# Patient Record
Sex: FEMALE | Race: WHITE | Employment: FULL TIME | URBAN - METROPOLITAN AREA
[De-identification: names, ages, dates, MRNs, and addresses within clinical notes are randomized per-mention and may not be internally consistent; named-entity substitution may affect disease eponyms.]

---

## 2017-06-22 PROBLEM — Z00.00 MEDICARE ANNUAL WELLNESS VISIT, SUBSEQUENT: Status: ACTIVE | Noted: 2017-06-22

## 2017-07-20 PROBLEM — Z71.89 ADVANCED CARE PLANNING/COUNSELING DISCUSSION: Chronic | Status: ACTIVE | Noted: 2017-07-20

## 2017-07-20 PROBLEM — Z00.00 WELCOME TO MEDICARE PREVENTIVE VISIT: Status: ACTIVE | Noted: 2017-07-20

## 2017-07-20 PROBLEM — Z71.89 ADVANCED CARE PLANNING/COUNSELING DISCUSSION: Status: ACTIVE | Noted: 2017-07-20

## 2017-07-20 PROBLEM — Z00.00 WELCOME TO MEDICARE PREVENTIVE VISIT: Chronic | Status: ACTIVE | Noted: 2017-07-20

## 2017-08-22 PROBLEM — Z12.11 SPECIAL SCREENING FOR MALIGNANT NEOPLASMS, COLON: Status: ACTIVE | Noted: 2017-08-22

## 2018-10-29 ENCOUNTER — HOSPITAL ENCOUNTER (OUTPATIENT)
Dept: MAMMOGRAPHY | Age: 68
Discharge: HOME | End: 2018-10-29
Attending: FAMILY MEDICINE
Payer: MEDICARE

## 2018-10-29 DIAGNOSIS — M94.9 DISORDER OF BONE AND CARTILAGE: ICD-10-CM

## 2018-10-29 DIAGNOSIS — Z13.820 SCREENING FOR OSTEOPOROSIS: ICD-10-CM

## 2018-10-29 DIAGNOSIS — M89.9 DISORDER OF BONE AND CARTILAGE: ICD-10-CM

## 2018-10-29 PROBLEM — M85.852 OSTEOPENIA OF LEFT HIP: Status: ACTIVE | Noted: 2018-10-29

## 2018-10-29 PROCEDURE — 77080 DXA BONE DENSITY AXIAL: CPT

## 2019-09-25 PROBLEM — Z12.11 SPECIAL SCREENING FOR MALIGNANT NEOPLASMS, COLON: Status: RESOLVED | Noted: 2017-08-22 | Resolved: 2019-09-25

## 2020-09-19 PROBLEM — Z12.11 SPECIAL SCREENING FOR MALIGNANT NEOPLASMS, COLON: Status: RESOLVED | Noted: 2017-08-22 | Resolved: 2020-09-19

## 2022-03-19 PROBLEM — Z00.00 MEDICARE ANNUAL WELLNESS VISIT, SUBSEQUENT: Status: ACTIVE | Noted: 2017-07-20

## 2022-03-19 PROBLEM — Z71.89 ADVANCED CARE PLANNING/COUNSELING DISCUSSION: Status: ACTIVE | Noted: 2017-07-20

## 2022-03-19 PROBLEM — M85.852 OSTEOPENIA OF LEFT HIP: Status: ACTIVE | Noted: 2018-10-29

## 2022-08-12 ENCOUNTER — OFFICE VISIT (OUTPATIENT)
Dept: ORTHOPEDIC SURGERY | Age: 72
End: 2022-08-12
Payer: COMMERCIAL

## 2022-08-12 ENCOUNTER — INITIAL CONSULT (OUTPATIENT)
Dept: CARDIOLOGY CLINIC | Age: 72
End: 2022-08-12
Payer: COMMERCIAL

## 2022-08-12 VITALS
SYSTOLIC BLOOD PRESSURE: 110 MMHG | DIASTOLIC BLOOD PRESSURE: 60 MMHG | WEIGHT: 165 LBS | HEART RATE: 71 BPM | BODY MASS INDEX: 28.17 KG/M2 | HEIGHT: 64 IN

## 2022-08-12 DIAGNOSIS — R53.82 CHRONIC FATIGUE: ICD-10-CM

## 2022-08-12 DIAGNOSIS — I10 PRIMARY HYPERTENSION: Primary | ICD-10-CM

## 2022-08-12 DIAGNOSIS — M70.61 TROCHANTERIC BURSITIS OF BOTH HIPS: Primary | ICD-10-CM

## 2022-08-12 DIAGNOSIS — E78.2 MIXED HYPERLIPIDEMIA: ICD-10-CM

## 2022-08-12 DIAGNOSIS — M70.62 TROCHANTERIC BURSITIS OF BOTH HIPS: Primary | ICD-10-CM

## 2022-08-12 DIAGNOSIS — R94.31 EKG, ABNORMAL: ICD-10-CM

## 2022-08-12 DIAGNOSIS — R06.02 SHORTNESS OF BREATH: ICD-10-CM

## 2022-08-12 PROCEDURE — 99204 OFFICE O/P NEW MOD 45 MIN: CPT | Performed by: INTERNAL MEDICINE

## 2022-08-12 PROCEDURE — 1123F ACP DISCUSS/DSCN MKR DOCD: CPT | Performed by: INTERNAL MEDICINE

## 2022-08-12 PROCEDURE — 93000 ELECTROCARDIOGRAM COMPLETE: CPT | Performed by: INTERNAL MEDICINE

## 2022-08-12 PROCEDURE — 20610 DRAIN/INJ JOINT/BURSA W/O US: CPT | Performed by: NURSE PRACTITIONER

## 2022-08-12 PROCEDURE — 1123F ACP DISCUSS/DSCN MKR DOCD: CPT | Performed by: NURSE PRACTITIONER

## 2022-08-12 RX ORDER — METHYLPREDNISOLONE ACETATE 40 MG/ML
40 INJECTION, SUSPENSION INTRA-ARTICULAR; INTRALESIONAL; INTRAMUSCULAR; SOFT TISSUE ONCE
Status: COMPLETED | OUTPATIENT
Start: 2022-08-12 | End: 2022-08-12

## 2022-08-12 RX ADMIN — METHYLPREDNISOLONE ACETATE 40 MG: 40 INJECTION, SUSPENSION INTRA-ARTICULAR; INTRALESIONAL; INTRAMUSCULAR; SOFT TISSUE at 09:01

## 2022-08-12 NOTE — PROGRESS NOTES
Patient ID:    Jeremy Hyatt  [id number]  16 y.o.  1950    Today: August 12, 2022      Chief Complaint:  Bilateral hip pain        Patient reports longstanding history of bilateral hip pain. The pain is predominately localized to the lateral hip and is characterized as a general ache with occasional sharp pain. They rate the pain ranging from 3-8 on the pain scale occurring in a cyclical fashion with periods of acute exacerbation. Symptoms are exacerbated with attempting to sleep on the hip, attempting to ascend stairs, and sitting for long periods of time which results in lateral hip pain. Patient denies significant anterior groin pain and denies significant issues with attempting to put on socks and shoes or when attempting to end into or out or a vehicle. No numbness of tingling going down the extremity. Patient has attempted prior conservative treatment including ice, heat, Tylenol, and NSAID's. Past Medical History:  Past Medical History:   Diagnosis Date    Hypertension     Map-dot-fingerprint corneal dystrophy     Dr. Carlos Manuel Villar    Mixed hyperlipidemia 5/8/2013       Past Surgical History:  Past Surgical History:   Procedure Laterality Date    BREAST SURGERY  1/20/03    CYST REMOVAL  7/23/93    RLQ removed at the time of the hernia repair    HERNIA REPAIR  7/23/93    rt side    HYSTEROSCOPY, STERILIZE W IMPLANTS  5/12/99    KNEE ARTHROSCOPY  2/1986    rt knee    OTHER SURGICAL HISTORY  11/16/98,1/28/99    stromal pucture rt eye for Map Dot Finger print dystrophy        Medications:     Prior to Admission medications    Medication Sig Start Date End Date Taking?  Authorizing Provider   calcium citrate-vitamin D (CITRACAL+D) 315-200 MG-UNIT per tablet Take 1 tablet by mouth daily (with breakfast)    Ar Automatic Reconciliation   carboxymethylcellulose (THERATEARS) 1 % ophthalmic gel Apply to eye    Ar Automatic Reconciliation   Cholecalciferol 50 MCG (2000 UT) TABS Take by mouth    Ar Automatic Reconciliation   Cyanocobalamin 1000 MCG SUBL Take 1,000 mcg by mouth daily    Ar Automatic Reconciliation   estradiol (ESTRACE) 0.1 MG/GM vaginal cream Place 2 g vaginally Twice a Week 22   Ar Automatic Reconciliation   hydroCHLOROthiazide (HYDRODIURIL) 12.5 MG tablet Take 12.5 mg by mouth 2 times daily 3/29/22   Ar Automatic Reconciliation   lisinopril (PRINIVIL;ZESTRIL) 20 MG tablet Take 20 mg by mouth daily 3/29/22   Ar Automatic Reconciliation   olopatadine (PATADAY) 0.2 % SOLN ophthalmic solution Apply 1 drop to eye daily    Ar Automatic Reconciliation   rosuvastatin (CRESTOR) 5 MG tablet Take 5 mg by mouth 3/29/22   Ar Automatic Reconciliation       Family History:     Family History   Problem Relation Age of Onset    Heart Attack Father         age 28     Cancer Mother         breast  later in life    High Cholesterol Sister     High Cholesterol Brother        Social History:      Social History     Tobacco Use    Smoking status: Never    Smokeless tobacco: Never   Substance Use Topics    Alcohol use: Yes     Alcohol/week: 1.7 standard drinks         Allergies: Allergies   Allergen Reactions    Atorvastatin Other (See Comments)    Erythromycin Nausea And Vomiting          ROS:  Patient Health Form has been filled out, reviewed, signed and included in the chart. ROS findings related to musculoskeletal problems were discussed with the patient. Patient advised to discuss non-orthopedic complaints with primary care physician. Objective:     Vitals: There were no vitals taken for this visit. General: Awake and alert. AAOx3. The patient ambulates with an antalgic gait. Psych: Mood appropriate  HEENT: Normocephalic, atraumatic  Neck: Supple  CV/Pulm: Breathing even and unlabored  Abdomen: Nondistended  Circulation: Normal without obvious arterial or venous deficiency. Pulses palpable bilateral lower extremities.   Lymphatic: No obvious lymphedema or lymphadenopathy noted.  Skin: No obvious lacerations or rashes noted. Musculoskeletal: No obvious deformity or pain with active movement of the upper extremities. Neuro: No obvious deficiency or weakness noted of the upper or lower extremities    Hip Exam:   Exam of the hip reveals tenderness to palpation over the trochanter. Minimal groin pain with resisted leg raise and fadir testing. Motion in preserved in the involved hip. No evidence of arterial of venous insufficiency. DP/PT pulses appreciable. Able to plantar- and dorsi-flex the foot/ankle. Imaging:    Images obtained today or prior    XR Pelvis 2/3 Views  Views Obtained: AP pelvis, lateral bilateral hip  Indication: bilateral Hip Pain  Findings: A/P Pelvis and lateral of the hip(s) were reviewed which demonstrate no obvious arthritic changes of the bilateral hip. There is no osteophyte formation around the acetabulum and femoral head. No obvious subchondral sclerosis noted. No obvious fracture appreciated. There is no acute bony abnormality such as malignancy appreciated. Impression: Normal appearing hip without evidence of advanced OA    TONE CHACON - CNP      Assessment:   Trochanteric Bursitis of the Hip    Plan:  Differential diagnosis and treatment options have been discussed with the patient. Risks, benefits and alternatives of each were discussed and patient questions answered. We discussed oral anti-inflammatory medications, IT band stretching, physical therapy and corticosteroid injections. We discussed that this generally is not a surgically managed condition. At this point the patient would like to proceed with Trochanteric bursal injection.  We discussed potential risks of steroid injection including but not limited to steroid flare with an associated increase in pain, fat necrosis, skin discoloration around the injection site, temporary increase in blood sugars in diabetic patients and possible facial flushing after injection. Treatment Rendered/ Procedure Note:    After discussion of risks, benefits and alternatives the patient wished to proceed with trochanteric injection. After prepping the area overlying the trochanteric bursal region of each hip the area was injected with 1cc of 40mg DepoMedrol along with 2-3cc of 0.25% marcaine. The patient tolerated the procedure without significant problem. RECOMMENDATIONS:    We discussed the importance of IT band stretching. Stretching needs to be done 2-3 times daily for a period of 6 weeks followed by maintenance stretching to be done 1-2 times daily for the foreseeable future. A handout discussing trochanteric bursitis along with stretching maneuvers is provided to the patient today. If the patient is still having significant issues/pain after 4-6 weeks can call for followup. If the patient fails to see any relief from treatment we may opt to evaluate the patient's hip and/or lumbar spine more closely. Otherwise the patient call followup as needed.      Glenn Palacios, TONE - CNP

## 2022-08-12 NOTE — PATIENT INSTRUCTIONS
Bursitis: Care Instructions  Your Care Instructions     A bursa is a small sac of fluid that helps the tissues around a joint slide over one another easily. Injury or overuse of a joint can cause pain, redness, and inflammation in the bursa (bursitis). Bursitis usually gets better if you avoid the activity that caused it. You can help prevent bursitis from coming back by doing stretching and strengthening exercises. You may also need tochange the way you do some activities. Follow-up care is a key part of your treatment and safety. Be sure to make and go to all appointments, and call your doctor if you are having problems. It's also a good idea to know your test results and keep alist of the medicines you take. How can you care for yourself at home? Put ice or a cold pack on the area for 10 to 20 minutes at a time. Try to do this every 1 to 2 hours for the next 3 days (when you are awake) or until the swelling goes down. Put a thin cloth between the ice and your skin. After the 3 days of using ice, you may use heat on the area. You can use a hot water bottle; a warm, moist towel; or a heating pad set on low. You can also try alternating heat and ice. Rest the area where you have pain. Stop any activities that cause pain. Switch to activities that do not stress the area. Take pain medicines exactly as directed. If the doctor gave you a prescription medicine for pain, take it as prescribed. If you are not taking a prescription pain medicine, ask your doctor if you can take an over-the-counter medicine. Do not take two or more pain medicines at the same time unless the doctor told you to. Many pain medicines have acetaminophen, which is Tylenol. Too much acetaminophen (Tylenol) can be harmful. To prevent stiffness, gently move the joint as much as you can without pain every day. As the pain gets better, keep doing range-of-motion exercises.  Ask your doctor for exercises that will make the muscles around the joint stronger. Do these as directed. You can slowly return to the activity that caused the pain, but do it with less effort until you can do it without pain or swelling. Be sure to warm up before and stretch after you do the activity. When should you call for help? Call your doctor now or seek immediate medical care if:    You have new or worse symptoms of infection, such as: Increased pain, swelling, warmth, or redness. Red streaks leading from the area. Pus draining from the area. A fever. Watch closely for changes in your health, and be sure to contact your doctor if:    You do not get better as expected. Where can you learn more? Go to [email or web address]://[email or web address] timeads.Groupspeak. org and sign in to your [employer] account. Enter N131 in the [employer] box to learn more about \"Bursitis: Care Instructions. \"     If you do not have an account, please click on the \"Sign Up Now\" link. Current as of: March 9, 2022               Content Version: 13.3  © 2006-2022 [employer]wise[employer] Incorporated. Care instructions adapted under license by [facility] ([facility]). If you have questions about a medical condition or this instruction, always ask your healthcare professional. [place] [address] any warranty or liability for your use of this information.

## 2022-08-12 NOTE — PROGRESS NOTES
[address], [address] [employer] [facility], [address] [place] [address]  PHONE: [phone]        22    NAME:  Trisha Issa  : 1950  MRN: [id number]       SUBJECTIVE:   Trisha Issa is a 71 y.o. female seen for a consultation visit regarding the following:     Chief Complaint   Patient presents with    Hypertension    Consultation          HPI:  Consultation is requested by Mikel Ramirez MD for evaluation of Hypertension and Consultation  Here for family hx of CAD. Some lack of energy. No real COVINGTON. NO CP, pressure. Cannot do as much now. Patient denies recent history of orthopnea, PND, excessive dizziness and/or syncope. Some hip pains, bursitis. Father  from MI, siblings with CAD. Past Medical History, Past Surgical History, Family history, Social History, and Medications were all reviewed with the patient today and updated as necessary. Current Outpatient Medications   Medication Sig Dispense Refill    DHA-EPA-Flaxseed Oil-Vitamin E (THERA TEARS NUTRITION PO) Take by mouth      calcium citrate-vitamin D (CITRACAL+D) 315-200 MG-UNIT per tablet Take 1 tablet by mouth daily (with breakfast)      carboxymethylcellulose (THERATEARS) 1 % ophthalmic gel Apply to eye      Cholecalciferol 50 MCG ( UT) TABS Take by mouth      Cyanocobalamin 1000 MCG SUBL Take 1,000 mcg by mouth daily      estradiol (ESTRACE) 0.1 MG/GM vaginal cream Place 2 g vaginally Twice a Week      hydroCHLOROthiazide (HYDRODIURIL) 12.5 MG tablet Take 12.5 mg by mouth 2 times daily      olopatadine (PATADAY) 0.2 % SOLN ophthalmic solution Apply 1 drop to eye daily      rosuvastatin (CRESTOR) 5 MG tablet Take 5 mg by mouth       No current facility-administered medications for this visit.         Allergies   Allergen Reactions    Atorvastatin Other (See Comments)    Erythromycin Nausea And Vomiting     Patient Active Problem List    Diagnosis Date Noted    Chronic fatigue 2022     Priority: Medium    Shortness of breath 2022     Priority: Medium    EKG, abnormal 2022     Priority: Medium    Osteopenia of left hip 10/29/2018     DEXA Oct 18        Advanced care planning/counseling discussion 2017     Updated 20        Medicare annual wellness visit, subsequent 2017        Atrophic vaginitis 2016    Glaucoma suspect 2016    Mixed hyperlipidemia 2013    Hypertension       Past Surgical History:   Procedure Laterality Date    BREAST SURGERY  03    CYST REMOVAL  93    RLQ removed at the time of the hernia repair    HERNIA REPAIR  93    rt side    HYSTEROSCOPY, STERILIZE W IMPLANTS  99    KNEE ARTHROSCOPY  1986    rt knee    OTHER SURGICAL HISTORY  98,99    stromal pucture rt eye for Map Dot Finger print dystrophy     Family History   Problem Relation Age of Onset    Heart Attack Father         age 28     Cancer Mother         breast  later in life    High Cholesterol Sister     High Cholesterol Brother      Social History     Tobacco Use    Smoking status: Never    Smokeless tobacco: Never   Substance Use Topics    Alcohol use: Yes     Alcohol/week: 1.7 standard drinks       ROS:    Constitutional:   Negative for fevers and unexplained weight loss. Eyes:   Negative for visual disturbance. ENT:   Negative for significant hearing loss and tinnitus. Respiratory:   Negative for hemoptysis. Cardiovascular:   Negative except as noted in HPI. Gastrointestinal:   Negative for melena and abdominal pain. Genitourinary:   Negative for hematuria, renal stones. Integumentary:   Negative for rash or non-healing wounds  Hematologic/Lymphatic:   Negative for excessive bleeding hx or clotting disorder. Musculoskeletal:  Negative for active, unexplained/severe joint pain. Neurological:   Negative for stroke. Behavioral/Psych:   Negative for suicidal ideations.    Endocrine:   Negative for uncontrolled diabetic symptoms including polyuria, polydipsia and poor wound healing. PHYSICAL EXAM:     /60   Pulse 71   Ht 5' 4\" (1.626 m)   Wt 165 lb (74.8 kg)   BMI 28.32 kg/m²    General/Constitutional:   Alert and oriented x 3, no acute distress  HEENT:   normocephalic, atraumatic, moist mucous membranes  Neck:   No JVD or carotid bruits bilaterally  Cardiovascular:   regular rate and rhythm, no murmur/rub/gallop appreciated  Pulmonary:   clear to auscultation bilaterally, no respiratory distress  Abdomen:   soft, non-tender, non-distended  Ext:   No sig LE edema bilaterally  Skin:  warm and dry, no obvious rashes seen  Neuro:   no obvious sensory or motor deficits  Psychiatric:   normal mood and affect      EKG Today and independently reviewed by me: sinus rhythm, normal intervals and non-specific ST/T wave changes. Medical problems, medical history and test results were reviewed with the patient today.        Lab Results   Component Value Date/Time     03/29/2022 09:50 AM    K 4.1 03/29/2022 09:50 AM    CL 96 03/29/2022 09:50 AM    CO2 25 03/29/2022 09:50 AM    BUN 13 03/29/2022 09:50 AM    CREATININE 0.84 03/29/2022 09:50 AM    GLUCOSE 99 03/29/2022 09:50 AM    CALCIUM 10.0 03/29/2022 09:50 AM        No results found for: WBC, HGB, HCT, MCV, PLT    No results found for: TSHFT4, TSH    No results found for: LABA1C  No results found for: EAG      Lab Results   Component Value Date    CHOL 179 09/28/2021    CHOL 270 (H) 10/06/2020    CHOL 243 (H) 11/07/2019     Lab Results   Component Value Date    TRIG 82 09/28/2021    TRIG 95 10/06/2020    TRIG 89 11/07/2019     Lab Results   Component Value Date    HDL 74 09/28/2021    HDL 80 10/06/2020    HDL 77 11/07/2019     Lab Results   Component Value Date    LDLCALC 90 09/28/2021    LDLCALC 174 (H) 10/06/2020    LDLCALC 148 (H) 11/07/2019     Lab Results   Component Value Date    LABVLDL 18 11/07/2019    VLDL 15 09/28/2021    VLDL 16 10/06/2020     No results found for: [place] VEL[place] have Independently reviewed prior care notes, any ER records available, cardiac testing, labs and results with the patient and before seeing the patient today. Also independently reviewed outside records when available. ASSESSMENT:    Johnny Bland was seen today for hypertension and consultation. Diagnoses and all orders for this visit:    Hypertension  -     EKG 12 Lead    Mixed hyperlipidemia    Chronic fatigue  -     Transthoracic echocardiogram (TTE) complete with contrast, bubble, strain, and 3D PRN; Future  -     Nuclear stress test with myocardial perfusion; Future    Shortness of breath  -     Transthoracic echocardiogram (TTE) complete with contrast, bubble, strain, and 3D PRN; Future  -     Nuclear stress test with myocardial perfusion; Future    EKG, abnormal        PLAN:     HTN: BP low, would like off meds. Stop the Ace, remain on HCTZ for some ankle edema. Follow. Check echo. COVINGTON/Lack of energy: Given these risk factors and symptoms as outlined, plan for NST. We did review options of NST, LHC, other stress testing and agreed to proceed with NST in our office. To ER for worsening angina. Plan on definitive LHC for worsening angina. HPL: on crestor. The patient has been instructed to call with any angina or equivalent as reviewed today. All questions were answered with the patient voicing complete understanding. Patient has been instructed and agrees to call our office with any issues or other concerns related to their cardiac condition(s) and/or complaint(s).         Return for Return After Test.       VICKIE HENDERSON DO  8/12/2022

## 2022-08-15 ENCOUNTER — TELEPHONE (OUTPATIENT)
Dept: CARDIOLOGY CLINIC | Age: 72
End: 2022-08-15

## 2022-08-15 NOTE — TELEPHONE ENCOUNTER
Patient called and has questions on upcoming stress test. She wants someone to please give her a call and answer the questions she has.

## 2022-08-23 ENCOUNTER — TELEPHONE (OUTPATIENT)
Dept: CARDIOLOGY CLINIC | Age: 72
End: 2022-08-23

## 2022-08-23 NOTE — TELEPHONE ENCOUNTER
----- Message from Stew Kingston DO sent at 8/23/2022  1:37 PM EDT -----  Please call the patient. NST was ok, nothing major or concerning. If having more angina (worsening COVINGTON, CP, SOB), please let us know. Will review more at their follow up appointment and get plan for the future.     Thanks,   Chanda Julian

## 2022-09-19 PROBLEM — Z76.89 ENCOUNTER TO ESTABLISH CARE WITH NEW DOCTOR: Status: ACTIVE | Noted: 2017-07-20

## 2022-09-19 ASSESSMENT — ENCOUNTER SYMPTOMS
NAUSEA: 0
COUGH: 0
DIARRHEA: 0
ABDOMINAL PAIN: 0
SHORTNESS OF BREATH: 0
VOMITING: 0

## 2022-09-19 NOTE — PROGRESS NOTES
[address], DO  [address] [facility], [address], [address] [facility] [address]  [phone]        ASSESSMENT AND PLAN    Problem List Items Addressed This Visit          Circulatory    Hypertension     Controlled today. Patient doing well on her Hydrochlorothiazide, but takes it twice a day. States that she is up multiple times at night to use the bathroom. Discussed only taking the Hydrochlorothiazide once in the morning, refill sent, and advised to keep an eye on her blood pressure at home to make sure it does not go up. Will recheck in 3 months. Will have patient return next week for labs. Relevant Orders    Comprehensive Metabolic Panel    CBC with Auto Differential       Other    Mixed hyperlipidemia     Controlled one year ago when patient was put on Crestor 5 mg. Not fasting today. Will have her return next week for labs. Refill of Crestor sent to pharmacy. Relevant Medications    rosuvastatin (CRESTOR) 5 MG tablet    hydroCHLOROthiazide (HYDRODIURIL) 12.5 MG tablet    Other Relevant Orders    Lipid Panel    Encounter to establish care with new doctor - Primary        The diagnoses and plan were discussed with the patient, who verbalizes understanding and agrees with plan. All questions answered. Chief Complaint    Chief Complaint   Patient presents with    Establish Care    Medication Refill       HISTORY OF PRESENT ILLNESS    71 y.o. female presents today to establish care with a new primary care provider. Has hypertension, hyperlipidemia and family history of cardiac disease. Just saw [facility] five weeks ago per PCP request for mild dyspnea on exertion and lack of energy, was continued only on HCTZ for HTN and ankle swelling. Had an Echo and stress test, results below. States that since stopping the Lisinopril she has not had any more dyspnea on exertion.   States that she takes a low dose of Crestor for hyperlipidemia but states that she had to stop Atorvastatin due to muscle aches. Notes that she has a little discomfort on the Crestor but not much. Sees Dr. Karolyn Storm again later this week for follow up on her Echo and stress test.  Denies chest pain or SOB today. Has not had labs checked in the last year, states that she is not fasting today.     PAST MEDICAL HISTORY    Past Medical History:   Diagnosis Date    Hypertension     Map-dot-fingerprint corneal dystrophy     Dr. Daxa Blake    Mixed hyperlipidemia 2013       PAST SURGICAL HISTORY    Past Surgical History:   Procedure Laterality Date    BREAST SURGERY  03    CYST REMOVAL  93    RLQ removed at the time of the hernia repair    HERNIA REPAIR  93    rt side    HYSTEROSCOPY, STERILIZE W IMPLANTS  99    KNEE ARTHROSCOPY  1986    rt knee    OTHER SURGICAL HISTORY  98,99    stromal pucture rt eye for Map Dot Finger print dystrophy       FAMILY HISTORY    Family History   Problem Relation Age of Onset    Heart Attack Father         age 28     Cancer Mother         breast  later in life    High Cholesterol Sister     High Cholesterol Brother        SOCIAL HISTORY    Social History     Socioeconomic History    Marital status:      Spouse name: None    Number of children: None    Years of education: None    Highest education level: None   Tobacco Use    Smoking status: Never    Smokeless tobacco: Never   Substance and Sexual Activity    Alcohol use: Yes     Comment: occ    Drug use: No    Sexual activity: Yes     Comment: spouse       MEDICATIONS      Current Outpatient Medications:     rosuvastatin (CRESTOR) 5 MG tablet, Take 1 tablet by mouth daily, Disp: 90 tablet, Rfl: 5    hydroCHLOROthiazide (HYDRODIURIL) 12.5 MG tablet, Take 1 tablet by mouth daily, Disp: 90 tablet, Rfl: 5    DHA-EPA-Flaxseed Oil-Vitamin E (THERA TEARS NUTRITION PO), Take by mouth, Disp: , Rfl:     calcium citrate-vitamin D (CITRACAL+D) 315-200 MG-UNIT per tablet, Take 1 tablet by mouth daily (with breakfast), Disp: , Rfl:     carboxymethylcellulose (THERATEARS) 1 % ophthalmic gel, Apply to eye, Disp: , Rfl:     Cholecalciferol 50 MCG (2000 UT) TABS, Take by mouth, Disp: , Rfl:     Cyanocobalamin 1000 MCG SUBL, Take 1,000 mcg by mouth daily, Disp: , Rfl:     estradiol (ESTRACE) 0.1 MG/GM vaginal cream, Place 2 g vaginally Twice a Week, Disp: , Rfl:     olopatadine (PATADAY) 0.2 % SOLN ophthalmic solution, Apply 1 drop to eye daily, Disp: , Rfl:     ALLERGIES / INTOLERANCES    Allergies   Allergen Reactions    Atorvastatin Other (See Comments)    Erythromycin Nausea And Vomiting       REVIEW OF SYSTEMS    Review of Systems   Constitutional:  Negative for fever. HENT:  Negative for congestion. Respiratory:  Negative for cough and shortness of breath. Cardiovascular:  Negative for chest pain. Gastrointestinal:  Negative for abdominal pain, diarrhea, nausea and vomiting. Psychiatric/Behavioral:  Negative for dysphoric mood. PHYSICAL EXAMINATION    Vitals:    09/20/22 0915   BP: 134/70   Pulse: 68   Resp: 16   SpO2: 98%       Physical Exam  Vitals and nursing note reviewed. Constitutional:       General: She is not in acute distress. Appearance: Normal appearance. HENT:      Head: Normocephalic and atraumatic. Right Ear: External ear normal.      Left Ear: External ear normal.      Nose: Nose normal.   Eyes:      Extraocular Movements: Extraocular movements intact. Pupils: Pupils are equal, round, and reactive to light. Cardiovascular:      Rate and Rhythm: Normal rate and regular rhythm. Heart sounds: Normal heart sounds. No murmur heard. Pulmonary:      Effort: Pulmonary effort is normal. No respiratory distress. Breath sounds: Normal breath sounds. Abdominal:      General: Bowel sounds are normal.      Palpations: Abdomen is soft.       Tenderness: no abdominal tenderness There is no right CVA tenderness or left CVA 09/28/2021    TRIG 95 10/06/2020    TRIG 89 11/07/2019     Lab Results   Component Value Date    HDL 74 09/28/2021    HDL 80 10/06/2020    HDL 77 11/07/2019     Lab Results   Component Value Date    LDLCALC 90 09/28/2021    LDLCALC 174 (H) 10/06/2020    LDLCALC 148 (H) 11/07/2019     Lab Results   Component Value Date    LABVLDL 18 11/07/2019    VLDL 15 09/28/2021    VLDL 16 10/06/2020     No results found for: [facility]  Lab Results   Component Value Date/Time     03/29/2022 09:50 AM    K 4.1 03/29/2022 09:50 AM    CL 96 03/29/2022 09:50 AM    CO2 25 03/29/2022 09:50 AM    BUN 13 03/29/2022 09:50 AM    CREATININE 0.84 03/29/2022 09:50 AM    GLUCOSE 99 03/29/2022 09:50 AM    CALCIUM 10.0 03/29/2022 09:50 AM            Neno Davison DO  10:51 AM  09/20/22

## 2022-09-20 ENCOUNTER — OFFICE VISIT (OUTPATIENT)
Dept: PRIMARY CARE CLINIC | Facility: CLINIC | Age: 72
End: 2022-09-20
Payer: COMMERCIAL

## 2022-09-20 VITALS
OXYGEN SATURATION: 98 % | WEIGHT: 166.4 LBS | RESPIRATION RATE: 16 BRPM | HEIGHT: 64 IN | SYSTOLIC BLOOD PRESSURE: 134 MMHG | HEART RATE: 68 BPM | BODY MASS INDEX: 28.41 KG/M2 | DIASTOLIC BLOOD PRESSURE: 70 MMHG

## 2022-09-20 DIAGNOSIS — Z76.89 ENCOUNTER TO ESTABLISH CARE WITH NEW DOCTOR: Primary | ICD-10-CM

## 2022-09-20 DIAGNOSIS — E78.2 MIXED HYPERLIPIDEMIA: ICD-10-CM

## 2022-09-20 DIAGNOSIS — I10 PRIMARY HYPERTENSION: ICD-10-CM

## 2022-09-20 PROCEDURE — 1123F ACP DISCUSS/DSCN MKR DOCD: CPT | Performed by: FAMILY MEDICINE

## 2022-09-20 PROCEDURE — 99214 OFFICE O/P EST MOD 30 MIN: CPT | Performed by: FAMILY MEDICINE

## 2022-09-20 RX ORDER — ROSUVASTATIN CALCIUM 5 MG/1
5 TABLET, COATED ORAL DAILY
Qty: 90 TABLET | Refills: 5 | Status: SHIPPED | OUTPATIENT
Start: 2022-09-20

## 2022-09-20 RX ORDER — HYDROCHLOROTHIAZIDE 12.5 MG/1
12.5 TABLET ORAL DAILY
Qty: 90 TABLET | Refills: 5 | Status: SHIPPED | OUTPATIENT
Start: 2022-09-20

## 2022-09-20 ASSESSMENT — ANXIETY QUESTIONNAIRES
5. BEING SO RESTLESS THAT IT IS HARD TO SIT STILL: 0
IF YOU CHECKED OFF ANY PROBLEMS ON THIS QUESTIONNAIRE, HOW DIFFICULT HAVE THESE PROBLEMS MADE IT FOR YOU TO DO YOUR WORK, TAKE CARE OF THINGS AT HOME, OR GET ALONG WITH OTHER PEOPLE: NOT DIFFICULT AT ALL
GAD7 TOTAL SCORE: 0
6. BECOMING EASILY ANNOYED OR IRRITABLE: 0
1. FEELING NERVOUS, ANXIOUS, OR ON EDGE: 0
2. NOT BEING ABLE TO STOP OR CONTROL WORRYING: 0
7. FEELING AFRAID AS IF SOMETHING AWFUL MIGHT HAPPEN: 0
4. TROUBLE RELAXING: 0
3. WORRYING TOO MUCH ABOUT DIFFERENT THINGS: 0

## 2022-09-20 ASSESSMENT — PATIENT HEALTH QUESTIONNAIRE - PHQ9
SUM OF ALL RESPONSES TO PHQ QUESTIONS 1-9: 0
SUM OF ALL RESPONSES TO PHQ QUESTIONS 1-9: 0
SUM OF ALL RESPONSES TO PHQ9 QUESTIONS 1 & 2: 0
SUM OF ALL RESPONSES TO PHQ QUESTIONS 1-9: 0
2. FEELING DOWN, DEPRESSED OR HOPELESS: 0
1. LITTLE INTEREST OR PLEASURE IN DOING THINGS: 0
SUM OF ALL RESPONSES TO PHQ QUESTIONS 1-9: 0

## 2022-09-20 NOTE — PATIENT INSTRUCTIONS
IT WAS GREAT TO MEET YOU TODAY! I HAVE PLACED ORDERS FOR LABS FOR YOU TO COME NEXT WEEK. PLEASE COME FASTING (NOTHING TO EAT OR DRINK AFTER MIDNIGHT THE NIGHT BEFORE) SO THAT I CAN GET ACCURATE CHOLESTEROL TESTS. I HAVE SENT YOUR MEDICATION REFILLS TO THE PHARMACY. START TAKING THE HYDROCHLOROTHIAZIDE ONCE IN THE MORNING, NOT TWICE A DAY. CHECK YOUR BLOOD PRESSURE A COUPLE OF TIMES PER WEEK. IF IT STARTS TO GO UP (OVER 140/90 MORE THAN ONCE OR TWICE), WE WILL NEED TO INCREASE YOUR HYDROCHLOROTHIAZIDE. I WILL CONTACT YOU ABOUT YOUR LAB RESULTS ON [employer].     I WILL SEE YOU IN 3 MONTHS BUT PLEASE CALL WITH concerns - [phone]

## 2022-09-20 NOTE — ASSESSMENT & PLAN NOTE
Controlled one year ago when patient was put on Crestor 5 mg. Not fasting today. Will have her return next week for labs. Refill of Crestor sent to pharmacy.

## 2022-09-20 NOTE — ASSESSMENT & PLAN NOTE
Controlled today. Patient doing well on her Hydrochlorothiazide, but takes it twice a day. States that she is up multiple times at night to use the bathroom. Discussed only taking the Hydrochlorothiazide once in the morning, refill sent, and advised to keep an eye on her blood pressure at home to make sure it does not go up. Will recheck in 3 months. Will have patient return next week for labs.

## 2022-09-23 ENCOUNTER — OFFICE VISIT (OUTPATIENT)
Dept: CARDIOLOGY CLINIC | Age: 72
End: 2022-09-23
Payer: COMMERCIAL

## 2022-09-23 VITALS
HEART RATE: 87 BPM | HEIGHT: 64 IN | DIASTOLIC BLOOD PRESSURE: 76 MMHG | WEIGHT: 167.2 LBS | BODY MASS INDEX: 28.54 KG/M2 | SYSTOLIC BLOOD PRESSURE: 122 MMHG

## 2022-09-23 DIAGNOSIS — I34.0 NONRHEUMATIC MITRAL VALVE REGURGITATION: ICD-10-CM

## 2022-09-23 DIAGNOSIS — R94.31 EKG, ABNORMAL: ICD-10-CM

## 2022-09-23 DIAGNOSIS — E78.2 MIXED HYPERLIPIDEMIA: ICD-10-CM

## 2022-09-23 DIAGNOSIS — I10 PRIMARY HYPERTENSION: Primary | ICD-10-CM

## 2022-09-23 PROCEDURE — 1123F ACP DISCUSS/DSCN MKR DOCD: CPT | Performed by: INTERNAL MEDICINE

## 2022-09-23 PROCEDURE — 99214 OFFICE O/P EST MOD 30 MIN: CPT | Performed by: INTERNAL MEDICINE

## 2022-09-23 NOTE — PROGRESS NOTES
[address], [address] [facility], [address] [place] [address]  PHONE: [phone]     22    NAME:  Olive Ly  : 1950  MRN: [id number]       SUBJECTIVE:   Olive Ly is a 71 y.o. female seen for a follow up visit regarding the following:     Chief Complaint   Patient presents with    Shortness of Breath     Echo and NST        HPI:   Here for family hx of CAD. Nst : LVEF measures 79%. LV perfusion is normal. There is no evidence of inducible ischemia. Echo : normal EF, mod MR    Off the Ace now, feeling better now. Less [place], no CP. Feeling better. Home -140, better now. Patient denies recent history of orthopnea, PND, excessive dizziness and/or syncope. Some hip pains, bursitis. Father  from MI, siblings with CAD. Past Medical History, Past Surgical History, Family history, Social History, and Medications were all reviewed with the patient today and updated as necessary. Current Outpatient Medications   Medication Sig Dispense Refill    rosuvastatin (CRESTOR) 5 MG tablet Take 1 tablet by mouth daily 90 tablet 5    hydroCHLOROthiazide (HYDRODIURIL) 12.5 MG tablet Take 1 tablet by mouth daily 90 tablet 5    DHA-EPA-Flaxseed Oil-Vitamin E (THERA TEARS NUTRITION PO) Take by mouth      calcium citrate-vitamin D (CITRACAL+D) 315-200 MG-UNIT per tablet Take 1 tablet by mouth daily (with breakfast)      carboxymethylcellulose (THERATEARS) 1 % ophthalmic gel Apply to eye      Cholecalciferol 50 MCG (2000 UT) TABS Take by mouth      Cyanocobalamin 1000 MCG SUBL Take 1,000 mcg by mouth daily      estradiol (ESTRACE) 0.1 MG/GM vaginal cream Place 2 g vaginally as needed      olopatadine (PATADAY) 0.2 % SOLN ophthalmic solution Apply 1 drop to eye daily       No current facility-administered medications for this visit.         Allergies   Allergen Reactions    Atorvastatin Other (See Comments)    Erythromycin Nausea And Vomiting     Patient Active Problem List Diagnosis Date Noted    Nonrheumatic mitral valve regurgitation 2022     Priority: Medium    Chronic fatigue 2022     Priority: Medium    Shortness of breath 2022     Priority: Medium    EKG, abnormal 2022     Priority: Medium    Osteopenia of left hip 10/29/2018     DEXA Oct 18        Advanced care planning/counseling discussion 2017     Updated 20        Encounter to establish care with new doctor 2017        Atrophic vaginitis 2016    Glaucoma suspect 2016    Mixed hyperlipidemia 2013    Hypertension       Past Surgical History:   Procedure Laterality Date    BREAST SURGERY  03    CYST REMOVAL  93    RLQ removed at the time of the hernia repair    HERNIA REPAIR  93    rt side    HYSTEROSCOPY, STERILIZE W IMPLANTS  99    KNEE ARTHROSCOPY  1986    rt knee    OTHER SURGICAL HISTORY  98,99    stromal pucture rt eye for Map Dot Finger print dystrophy     Family History   Problem Relation Age of Onset    Heart Attack Father         age 28     Cancer Mother         breast  later in life    High Cholesterol Sister     High Cholesterol Brother      Social History     Tobacco Use    Smoking status: Never    Smokeless tobacco: Never   Substance Use Topics    Alcohol use: Yes     Comment: occ         ROS:    No obvious pertinent positives on review of systems except for what was outlined in the HPI today.       PHYSICAL EXAM:     /76   Pulse 87   Ht 5' 4\" (1.626 m)   Wt 167 lb 3.2 oz (75.8 kg)   BMI 28.70 kg/m²    General/Constitutional:   Alert and oriented x 3, no acute distress  HEENT:   normocephalic, atraumatic, moist mucous membranes  Neck:   No JVD or carotid bruits bilaterally  Cardiovascular:   regular rate and rhythm, no murmur/rub/gallop appreciated  Pulmonary:   clear to auscultation bilaterally, no respiratory distress  Abdomen:   soft, non-tender, non-distended  Ext:   No sig LE edema stress test.  We also discused the importance of ongoing risk factor modification for the prevention of future cardiovascular events. A healthy lifestyle was discussed. This would include heart-healthy diet, regular aerobic exercises, maintenance of desirable body weight and avoidance of tobacco products  Patient is encouraged to contact the office with any recurrent symptoms or concerns as reviewed today. All questions were answered with the patient voicing understanding. MR:  follow, echo in 2-3 yr. HPL: on crestor. Patient has been instructed and agrees to call our office with any issues or other concerns related to their cardiac condition(s) and/or complaint(s). Return in about 1 year (around 9/23/2023).        VICKIE HENDERSON, DO  9/23/2022

## 2022-10-27 ENCOUNTER — TELEPHONE (OUTPATIENT)
Dept: PRIMARY CARE CLINIC | Facility: CLINIC | Age: 72
End: 2022-10-27

## 2022-10-27 NOTE — TELEPHONE ENCOUNTER
Hi,   Patient stated that her blood pressure has gone up and wanted to discuss her medication and the dosage/frequency.    This morning it was 130/95      Please advise

## 2022-11-02 DIAGNOSIS — I10 PRIMARY HYPERTENSION: ICD-10-CM

## 2022-11-02 DIAGNOSIS — E78.2 MIXED HYPERLIPIDEMIA: ICD-10-CM

## 2022-11-03 LAB
ALBUMIN SERPL-MCNC: 4.1 G/DL (ref 3.2–4.6)
ALBUMIN/GLOB SERPL: 1.5 {RATIO} (ref 0.4–1.6)
ALP SERPL-CCNC: 65 U/L (ref 50–136)
ALT SERPL-CCNC: 25 U/L (ref 12–65)
ANION GAP SERPL CALC-SCNC: 2 MMOL/L (ref 2–11)
AST SERPL-CCNC: 15 U/L (ref 15–37)
BASOPHILS # BLD: 0 K/UL (ref 0–0.2)
BASOPHILS NFR BLD: 1 % (ref 0–2)
BILIRUB SERPL-MCNC: 0.7 MG/DL (ref 0.2–1.1)
BUN SERPL-MCNC: 12 MG/DL (ref 8–23)
CALCIUM SERPL-MCNC: 10 MG/DL (ref 8.3–10.4)
CHLORIDE SERPL-SCNC: 105 MMOL/L (ref 101–110)
CHOLEST SERPL-MCNC: 193 MG/DL
CO2 SERPL-SCNC: 31 MMOL/L (ref 21–32)
CREAT SERPL-MCNC: 0.9 MG/DL (ref 0.6–1)
DIFFERENTIAL METHOD BLD: ABNORMAL
EOSINOPHIL # BLD: 0.1 K/UL (ref 0–0.8)
EOSINOPHIL NFR BLD: 1 % (ref 0.5–7.8)
ERYTHROCYTE [DISTWIDTH] IN BLOOD BY AUTOMATED COUNT: 13.7 % (ref 11.9–14.6)
GLOBULIN SER CALC-MCNC: 2.7 G/DL (ref 2.8–4.5)
GLUCOSE SERPL-MCNC: 101 MG/DL (ref 65–100)
HCT VFR BLD AUTO: 43.9 % (ref 35.8–46.3)
HDLC SERPL-MCNC: 92 MG/DL (ref 40–60)
HDLC SERPL: 2.1 {RATIO}
HGB BLD-MCNC: 14 G/DL (ref 11.7–15.4)
IMM GRANULOCYTES # BLD AUTO: 0 K/UL (ref 0–0.5)
IMM GRANULOCYTES NFR BLD AUTO: 0 % (ref 0–5)
LDLC SERPL CALC-MCNC: 87.8 MG/DL
LYMPHOCYTES # BLD: 1.7 K/UL (ref 0.5–4.6)
LYMPHOCYTES NFR BLD: 25 % (ref 13–44)
MCH RBC QN AUTO: 32.8 PG (ref 26.1–32.9)
MCHC RBC AUTO-ENTMCNC: 31.9 G/DL (ref 31.4–35)
MCV RBC AUTO: 102.8 FL (ref 82–102)
MONOCYTES # BLD: 0.7 K/UL (ref 0.1–1.3)
MONOCYTES NFR BLD: 10 % (ref 4–12)
NEUTS SEG # BLD: 4.2 K/UL (ref 1.7–8.2)
NEUTS SEG NFR BLD: 63 % (ref 43–78)
NRBC # BLD: 0 K/UL (ref 0–0.2)
PLATELET # BLD AUTO: 237 K/UL (ref 150–450)
PMV BLD AUTO: 10.1 FL (ref 9.4–12.3)
POTASSIUM SERPL-SCNC: 3.8 MMOL/L (ref 3.5–5.1)
PROT SERPL-MCNC: 6.8 G/DL (ref 6.3–8.2)
RBC # BLD AUTO: 4.27 M/UL (ref 4.05–5.2)
SODIUM SERPL-SCNC: 138 MMOL/L (ref 133–143)
TRIGL SERPL-MCNC: 66 MG/DL (ref 35–150)
VLDLC SERPL CALC-MCNC: 13.2 MG/DL (ref 6–23)
WBC # BLD AUTO: 6.6 K/UL (ref 4.3–11.1)

## 2022-11-15 ENCOUNTER — OFFICE VISIT (OUTPATIENT)
Dept: ORTHOPEDIC SURGERY | Age: 72
End: 2022-11-15
Payer: COMMERCIAL

## 2022-11-15 DIAGNOSIS — M70.62 TROCHANTERIC BURSITIS OF BOTH HIPS: Primary | ICD-10-CM

## 2022-11-15 DIAGNOSIS — M70.61 TROCHANTERIC BURSITIS OF BOTH HIPS: Primary | ICD-10-CM

## 2022-11-15 PROCEDURE — 20610 DRAIN/INJ JOINT/BURSA W/O US: CPT | Performed by: ORTHOPAEDIC SURGERY

## 2022-11-15 RX ORDER — METHYLPREDNISOLONE ACETATE 40 MG/ML
40 INJECTION, SUSPENSION INTRA-ARTICULAR; INTRALESIONAL; INTRAMUSCULAR; SOFT TISSUE ONCE
Status: COMPLETED | OUTPATIENT
Start: 2022-11-15 | End: 2022-11-15

## 2022-11-15 RX ADMIN — METHYLPREDNISOLONE ACETATE 40 MG: 40 INJECTION, SUSPENSION INTRA-ARTICULAR; INTRALESIONAL; INTRAMUSCULAR; SOFT TISSUE at 10:50

## 2022-11-15 NOTE — PATIENT INSTRUCTIONS
Bursitis: Care Instructions  Overview     A bursa is a small sac of fluid that helps the tissues around a joint slide over one another easily. Injury or overuse of a joint can cause pain, redness, and inflammation in the bursa (bursitis). Bursitis usually gets better if you avoid the activity that caused it. You can help prevent bursitis from coming back by doing stretching and strengthening exercises. You may also need to change the way you do some activities. Follow-up care is a key part of your treatment and safety. Be sure to make and go to all appointments, and call your doctor if you are having problems. It's also a good idea to know your test results and keep a list of the medicines you take. How can you care for yourself at home? Put ice or a cold pack on the area for 10 to 20 minutes at a time. Try to do this every 1 to 2 hours for the next 3 days (when you are awake) or until the swelling goes down. Put a thin cloth between the ice and your skin. After the 3 days of using ice, you may use heat on the area. You can use a hot water bottle; a warm, moist towel; or a heating pad set on low. You can also try alternating heat and ice. Rest the area where you have pain. Stop any activities that cause pain. Switch to activities that do not stress the area. Take pain medicines exactly as directed. If the doctor gave you a prescription medicine for pain, take it as prescribed. If you are not taking a prescription pain medicine, ask your doctor if you can take an over-the-counter medicine. Do not take two or more pain medicines at the same time unless the doctor told you to. Many pain medicines have acetaminophen, which is Tylenol. Too much acetaminophen (Tylenol) can be harmful. To prevent stiffness, gently move the joint as much as you can without pain every day. As the pain gets better, keep doing range-of-motion exercises.  Ask your doctor for exercises that will make the muscles around the joint stronger. Do these as directed. You can slowly return to the activity that caused the pain, but do it with less effort until you can do it without pain or swelling. Be sure to warm up before and stretch after you do the activity. When should you call for help? Call your doctor now or seek immediate medical care if:    You have new or worse symptoms of infection, such as: Increased pain, swelling, warmth, or redness. Red streaks leading from the area. Pus draining from the area. A fever. Watch closely for changes in your health, and be sure to contact your doctor if:    You do not get better as expected. Where can you learn more? Go to [email or web address] org and sign in to your [employer] account. Enter H366 in the [employer] box to learn more about \"Bursitis: Care Instructions. \"     If you do not have an account, please click on the \"Sign Up Now\" link. Current as of: March 9, 2022               Content Version: 13.4  © 2006-2022 [employer]wise, Incorporated. Care instructions adapted under license by [facility] ([facility]). If you have questions about a medical condition or this instruction, always ask your healthcare professional. [place] [address] any warranty or liability for your use of this information.

## 2022-11-15 NOTE — PROGRESS NOTES
Patient ID:    Rudy Souza  [id number]  7 y.o.  1950    Today: November 15, 2022      Chief Complaint:  Bilateral hip pain        Patient reports longstanding history of bilateral hip pain. The pain is predominately localized to the lateral hip and is characterized as a general ache with occasional sharp pain. They rate the pain ranging from 3-8 on the pain scale occurring in a cyclical fashion with periods of acute exacerbation. Symptoms are exacerbated with attempting to sleep on the hip, attempting to ascend stairs, and sitting for long periods of time which results in lateral hip pain. Patient denies significant anterior groin pain and denies significant issues with attempting to put on socks and shoes or when attempting to end into or out or a vehicle. No numbness of tingling going down the extremity. Patient has attempted prior conservative treatment including ice, heat, Tylenol, and NSAID's. Past Medical History:  Past Medical History:   Diagnosis Date    Hypertension     Map-dot-fingerprint corneal dystrophy     Dr. Paul Llamas    Mixed hyperlipidemia 5/8/2013       Past Surgical History:  Past Surgical History:   Procedure Laterality Date    BREAST SURGERY  1/20/03    CYST REMOVAL  7/23/93    RLQ removed at the time of the hernia repair    HERNIA REPAIR  7/23/93    rt side    HYSTEROSCOPY, STERILIZE W IMPLANTS  5/12/99    KNEE ARTHROSCOPY  2/1986    rt knee    OTHER SURGICAL HISTORY  11/16/98,1/28/99    stromal pucture rt eye for Map Dot Finger print dystrophy        Medications:     Prior to Admission medications    Medication Sig Start Date End Date Taking?  Authorizing Provider   rosuvastatin (CRESTOR) 5 MG tablet Take 1 tablet by mouth daily 9/20/22   Ynes Vazquez,    hydroCHLOROthiazide (HYDRODIURIL) 12.5 MG tablet Take 1 tablet by mouth daily 9/20/22   Ynes Vazquez,    DHA-EPA-Flaxseed Oil-Vitamin E (THERA TEARS NUTRITION PO) Take by mouth    Historical Provider, MD   calcium citrate-vitamin D (CITRACAL+D) 315-200 MG-UNIT per tablet Take 1 tablet by mouth daily (with breakfast)    Ar Automatic Reconciliation   carboxymethylcellulose (THERATEARS) 1 % ophthalmic gel Apply to eye    Ar Automatic Reconciliation   Cholecalciferol 50 MCG (2000 UT) TABS Take by mouth    Ar Automatic Reconciliation   Cyanocobalamin 1000 MCG SUBL Take 1,000 mcg by mouth daily    Ar Automatic Reconciliation   estradiol (ESTRACE) 0.1 MG/GM vaginal cream Place 2 g vaginally as needed 22   Ar Automatic Reconciliation   olopatadine (PATADAY) 0.2 % SOLN ophthalmic solution Apply 1 drop to eye daily    Ar Automatic Reconciliation       Family History:     Family History   Problem Relation Age of Onset    Heart Attack Father         age 28     Cancer Mother         breast  later in life    High Cholesterol Sister     High Cholesterol Brother        Social History:      Social History     Tobacco Use    Smoking status: Never    Smokeless tobacco: Never   Substance Use Topics    Alcohol use: Yes     Comment: occ           Allergies: Allergies   Allergen Reactions    Atorvastatin Other (See Comments)    Erythromycin Nausea And Vomiting            ROS:  Patient Health Form has been filled out, reviewed, signed and included in the chart. ROS findings related to musculoskeletal problems were discussed with the patient. Patient advised to discuss non-orthopedic complaints with primary care physician. Objective:     Vitals: There were no vitals taken for this visit. General: Awake and alert. AAOx3. The patient ambulates with an antalgic gait. Psych: Mood appropriate  HEENT: Normocephalic, atraumatic  Neck: Supple  CV/Pulm: Breathing even and unlabored  Abdomen: Nondistended  Circulation: Normal without obvious arterial or venous deficiency. Pulses palpable bilateral lower extremities. Lymphatic: No obvious lymphedema or lymphadenopathy noted.   Skin: No obvious lacerations or rashes noted. Musculoskeletal: No obvious deformity or pain with active movement of the upper extremities. Neuro: No obvious deficiency or weakness noted of the upper or lower extremities    Hip Exam:   Exam of the hip reveals tenderness to palpation over the trochanter. Minimal groin pain with resisted leg raise and fadir testing. Motion in preserved in the involved hip. No evidence of arterial of venous insufficiency. DP/PT pulses appreciable. Able to plantar- and dorsi-flex the foot/ankle. Imaging:    Images obtained today or prior    XR Pelvis 2/3 Views  Views Obtained: AP pelvis, lateral bilateral hip  Indication: bilateral Hip Pain  Findings: A/P Pelvis and lateral of the hip(s) were reviewed which demonstrate no obvious arthritic changes of the bilateral hip. There is no osteophyte formation around the acetabulum and femoral head. No obvious subchondral sclerosis noted. No obvious fracture appreciated. There is no acute bony abnormality such as malignancy appreciated. Impression: Normal appearing hip without evidence of advanced OA    Yash Estevez MD      Assessment:   Trochanteric Bursitis of the Hip    Plan:  Differential diagnosis and treatment options have been discussed with the patient. Risks, benefits and alternatives of each were discussed and patient questions answered. We discussed oral anti-inflammatory medications, IT band stretching, physical therapy and corticosteroid injections. We discussed that this generally is not a surgically managed condition. At this point the patient would like to proceed with Trochanteric bursal injection. We discussed potential risks of steroid injection including but not limited to steroid flare with an associated increase in pain, fat necrosis, skin discoloration around the injection site, temporary increase in blood sugars in diabetic patients and possible facial flushing after injection.     Treatment Rendered/ Procedure Note:    After discussion of risks, benefits and alternatives the patient wished to proceed with trochanteric injection. After prepping the area overlying the trochanteric bursal region of each hip the area was injected with 1cc of 40mg DepoMedrol along with 2-3cc of 0.25% marcaine. The patient tolerated the procedure without significant problem. RECOMMENDATIONS:    We discussed the importance of IT band stretching. Stretching needs to be done 2-3 times daily for a period of 6 weeks followed by maintenance stretching to be done 1-2 times daily for the foreseeable future. A handout discussing trochanteric bursitis along with stretching maneuvers is provided to the patient today. If the patient is still having significant issues/pain after 4-6 weeks can call for followup. If the patient fails to see any relief from treatment we may opt to evaluate the patient's hip and/or lumbar spine more closely. Otherwise the patient call followup as needed.      Britt Romano MD

## 2022-12-12 ASSESSMENT — ENCOUNTER SYMPTOMS
VOMITING: 0
DIARRHEA: 0
COUGH: 0
SHORTNESS OF BREATH: 0
ABDOMINAL PAIN: 0
NAUSEA: 0

## 2022-12-12 NOTE — PROGRESS NOTES
[address], DO  [address] [facility], [place] [address], [address]  [phone]         ASSESSMENT AND PLAN    Problem List Items Addressed This Visit          Circulatory    Hypertension - Primary     Controlled on 25 mg of HCTZ, patient taking two 12.5 mg tablets in the morning. Reviewed previous labs. Sent refill of 25 mg tablets to her pharmacy to use when she finishes the 12.5 mg tablets. Will recheck in 6 months. Other    Injury of nail bed of toe     Patient with slowly-healing injury of left great toenail after dropping luggage on it a few years ago. On evaluation today has some new nail growing. Discussed keeping her great toenail trimmed back so that it won't catch on anything and will continue to monitor. Nasal mucosa dry     Mild edema noted of bilateral turbinates today without obvious drainage. Discussed use of nasal saline and use of a humidifier at night to help add moisture and use the Flonase only if she feels like her nose is swollen. The diagnoses and plan were discussed with the patient, who verbalizes understanding and agrees with plan. All questions answered. Chief Complaint    Chief Complaint   Patient presents with    Follow-up     Hypertension    Nail Problem     Issue is being taken care of by a dermatologist but wants to make Provider aware        HISTORY OF PRESENT ILLNESS    71 y.o. female with HTN and HLD presents today for follow up. Last seen three months ago to establish care. Refilled her Crestor and told to take her HCTZ only once in the morning. In the interim her BP went up so she was told to take two of the HCTZ in the morning. States that she has been doing two every morning and her blood pressure has been controlled. States that she has noticed some nasal congestion and pressure in her ears at times. Uses Flonase sometimes, which helps.   States that she does not like to take Sudafed. States that she will use nasal saline at times because she feels dry. Does not have nasal congestion or drainage. States that she has had problems with her left great toenail. Has had issues with it since dropping luggage on it after a cruise several years ago. States that the nail never fully recovered. States that she went to the Dermatologist a couple of years ago and was told that as long as it is growing to just keep an eye on it. Notes that the toenail gets caught on things but denies drainage or bleeding. Notes some soreness at times and states that it has remained discolored for some time. Does note that it has been growing some.     PAST MEDICAL HISTORY    Past Medical History:   Diagnosis Date    Hypertension     Map-dot-fingerprint corneal dystrophy     Dr. Frederic Coreas    Mixed hyperlipidemia 2013       PAST SURGICAL HISTORY    Past Surgical History:   Procedure Laterality Date    BREAST SURGERY  03    CYST REMOVAL  93    RLQ removed at the time of the hernia repair    HERNIA REPAIR  93    rt side    HYSTEROSCOPY, STERILIZE W IMPLANTS  99    KNEE ARTHROSCOPY  1986    rt knee    OTHER SURGICAL HISTORY  98,99    stromal pucture rt eye for Map Dot Finger print dystrophy       FAMILY HISTORY    Family History   Problem Relation Age of Onset    Heart Attack Father         age 28     Cancer Mother         breast  later in life    High Cholesterol Sister     High Cholesterol Brother        SOCIAL HISTORY    Social History     Socioeconomic History    Marital status:    Tobacco Use    Smoking status: Never    Smokeless tobacco: Never   Substance and Sexual Activity    Alcohol use: Yes     Comment: occ    Drug use: No    Sexual activity: Yes     Comment: spouse       MEDICATIONS      Current Outpatient Medications:     hydroCHLOROthiazide (HYDRODIURIL) 25 MG tablet, Take 1 tablet by mouth every morning, Disp: 90 tablet, Rfl: 5    rosuvastatin (CRESTOR) 5 MG tablet, Take 1 tablet by mouth daily, Disp: 90 tablet, Rfl: 5    DHA-EPA-Flaxseed Oil-Vitamin E (THERA TEARS NUTRITION PO), Take by mouth, Disp: , Rfl:     calcium citrate-vitamin D (CITRACAL+D) 315-200 MG-UNIT per tablet, Take 1 tablet by mouth daily (with breakfast), Disp: , Rfl:     carboxymethylcellulose (THERATEARS) 1 % ophthalmic gel, Apply to eye, Disp: , Rfl:     Cholecalciferol 50 MCG (2000 UT) TABS, Take by mouth, Disp: , Rfl:     Cyanocobalamin 1000 MCG SUBL, Take 1,000 mcg by mouth daily, Disp: , Rfl:     estradiol (ESTRACE) 0.1 MG/GM vaginal cream, Place 2 g vaginally as needed, Disp: , Rfl:     olopatadine (PATADAY) 0.2 % SOLN ophthalmic solution, Apply 1 drop to eye daily, Disp: , Rfl:     ALLERGIES / INTOLERANCES    Allergies   Allergen Reactions    Atorvastatin Other (See Comments)    Erythromycin Nausea And Vomiting       REVIEW OF SYSTEMS    Review of Systems   Constitutional:  Negative for fever. HENT:  Positive for congestion. Respiratory:  Negative for cough and shortness of breath. Cardiovascular:  Negative for chest pain. Gastrointestinal:  Negative for abdominal pain, diarrhea, nausea and vomiting. Skin:  Positive for color change. Psychiatric/Behavioral:  Negative for dysphoric mood. PHYSICAL EXAMINATION    Vitals:    12/13/22 1309   BP: 134/78   Pulse: 89   Resp: 16   SpO2: 99%         Physical Exam  Vitals and nursing note reviewed. Constitutional:       General: She is not in acute distress. Appearance: Normal appearance. HENT:      Head: Normocephalic and atraumatic. Right Ear: External ear normal. A middle ear effusion is present. Left Ear: External ear normal. A middle ear effusion is present. Nose: Nose normal.      Right Turbinates: Enlarged. Left Turbinates: Enlarged. Mouth/Throat:      Pharynx: Posterior oropharyngeal erythema present.       Comments: Postnasal drip  Eyes:      Extraocular Movements: Extraocular movements intact. Pupils: Pupils are equal, round, and reactive to light. Cardiovascular:      Rate and Rhythm: Normal rate and regular rhythm. Heart sounds: Normal heart sounds. No murmur heard. Pulmonary:      Effort: Pulmonary effort is normal. No respiratory distress. Breath sounds: Normal breath sounds. Abdominal:      General: Bowel sounds are normal.      Palpations: Abdomen is soft. Tenderness: There is no abdominal tenderness. There is no right CVA tenderness or left CVA tenderness. Musculoskeletal:         General: Normal range of motion. Cervical back: Normal range of motion. Skin:     General: Skin is warm. Findings: No rash. Comments: Left great toenail with thickened toenail, horizontal split just distal to nailbed with new nail growing below the split   Neurological:      General: No focal deficit present. Mental Status: She is alert.    Psychiatric:         Mood and Affect: Mood normal.         Behavior: Behavior normal.           RESULTS    Lab Results   Component Value Date    CHOL 193 11/02/2022    CHOL 179 09/28/2021    CHOL 270 (H) 10/06/2020     Lab Results   Component Value Date    TRIG 66 11/02/2022    TRIG 82 09/28/2021    TRIG 95 10/06/2020     Lab Results   Component Value Date    HDL 92 (H) 11/02/2022    HDL 74 09/28/2021    HDL 80 10/06/2020     Lab Results   Component Value Date    LDLCALC 87.8 11/02/2022    LDLCALC 90 09/28/2021    LDLCALC 174 (H) 10/06/2020     Lab Results   Component Value Date    LABVLDL 13.2 11/02/2022    LABVLDL 18 11/07/2019    VLDL 15 09/28/2021    VLDL 16 10/06/2020     Lab Results   Component Value Date    CHOLHDLRATIO 2.1 11/02/2022     Lab Results   Component Value Date     11/02/2022    K 3.8 11/02/2022     11/02/2022    CO2 31 11/02/2022    BUN 12 11/02/2022    CREATININE 0.90 11/02/2022    GLUCOSE 101 (H) 11/02/2022    CALCIUM 10.0 11/02/2022    PROT 6.8 11/02/2022    LABALBU 4.1 11/02/2022 BILITOT 0.7 11/02/2022    ALKPHOS 65 11/02/2022    AST 15 11/02/2022    ALT 25 11/02/2022    LABGLOM >60 11/02/2022    GFRAA 86 09/28/2021    GLOB 2.7 (L) 11/02/2022       Lab Results   Component Value Date    WBC 6.6 11/02/2022    HGB 14.0 11/02/2022    HCT 43.9 11/02/2022    .8 (H) 11/02/2022     11/02/2022         Lauren Dense, DO  2:39 PM  12/13/22

## 2022-12-13 ENCOUNTER — OFFICE VISIT (OUTPATIENT)
Dept: PRIMARY CARE CLINIC | Facility: CLINIC | Age: 72
End: 2022-12-13
Payer: COMMERCIAL

## 2022-12-13 VITALS
RESPIRATION RATE: 16 BRPM | WEIGHT: 165.6 LBS | OXYGEN SATURATION: 99 % | HEIGHT: 64 IN | BODY MASS INDEX: 28.27 KG/M2 | SYSTOLIC BLOOD PRESSURE: 134 MMHG | HEART RATE: 89 BPM | DIASTOLIC BLOOD PRESSURE: 78 MMHG

## 2022-12-13 DIAGNOSIS — S99.929A INJURY OF NAIL BED OF TOE: ICD-10-CM

## 2022-12-13 DIAGNOSIS — J34.89 NASAL MUCOSA DRY: ICD-10-CM

## 2022-12-13 DIAGNOSIS — I10 PRIMARY HYPERTENSION: Primary | ICD-10-CM

## 2022-12-13 PROCEDURE — 99214 OFFICE O/P EST MOD 30 MIN: CPT | Performed by: FAMILY MEDICINE

## 2022-12-13 PROCEDURE — 3074F SYST BP LT 130 MM HG: CPT | Performed by: FAMILY MEDICINE

## 2022-12-13 PROCEDURE — 3078F DIAST BP <80 MM HG: CPT | Performed by: FAMILY MEDICINE

## 2022-12-13 PROCEDURE — 1123F ACP DISCUSS/DSCN MKR DOCD: CPT | Performed by: FAMILY MEDICINE

## 2022-12-13 RX ORDER — HYDROCHLOROTHIAZIDE 25 MG/1
25 TABLET ORAL EVERY MORNING
Qty: 90 TABLET | Refills: 5 | Status: SHIPPED | OUTPATIENT
Start: 2022-12-13

## 2022-12-13 ASSESSMENT — ENCOUNTER SYMPTOMS: COLOR CHANGE: 1

## 2022-12-13 ASSESSMENT — PATIENT HEALTH QUESTIONNAIRE - PHQ9
SUM OF ALL RESPONSES TO PHQ QUESTIONS 1-9: 0
SUM OF ALL RESPONSES TO PHQ QUESTIONS 1-9: 0
SUM OF ALL RESPONSES TO PHQ9 QUESTIONS 1 & 2: 0
1. LITTLE INTEREST OR PLEASURE IN DOING THINGS: 0
SUM OF ALL RESPONSES TO PHQ QUESTIONS 1-9: 0
2. FEELING DOWN, DEPRESSED OR HOPELESS: 0
SUM OF ALL RESPONSES TO PHQ QUESTIONS 1-9: 0

## 2022-12-13 ASSESSMENT — ANXIETY QUESTIONNAIRES
IF YOU CHECKED OFF ANY PROBLEMS ON THIS QUESTIONNAIRE, HOW DIFFICULT HAVE THESE PROBLEMS MADE IT FOR YOU TO DO YOUR WORK, TAKE CARE OF THINGS AT HOME, OR GET ALONG WITH OTHER PEOPLE: NOT DIFFICULT AT ALL
6. BECOMING EASILY ANNOYED OR IRRITABLE: 0
5. BEING SO RESTLESS THAT IT IS HARD TO SIT STILL: 0
3. WORRYING TOO MUCH ABOUT DIFFERENT THINGS: 0
2. NOT BEING ABLE TO STOP OR CONTROL WORRYING: 0
4. TROUBLE RELAXING: 0
GAD7 TOTAL SCORE: 0
7. FEELING AFRAID AS IF SOMETHING AWFUL MIGHT HAPPEN: 0
1. FEELING NERVOUS, ANXIOUS, OR ON EDGE: 0

## 2022-12-13 NOTE — ASSESSMENT & PLAN NOTE
Controlled on 25 mg of HCTZ, patient taking two 12.5 mg tablets in the morning. Reviewed previous labs. Sent refill of 25 mg tablets to her pharmacy to use when she finishes the 12.5 mg tablets. Will recheck in 6 months.

## 2022-12-13 NOTE — ASSESSMENT & PLAN NOTE
Patient with slowly-healing injury of left great toenail after dropping luggage on it a few years ago. On evaluation today has some new nail growing. Discussed keeping her great toenail trimmed back so that it won't catch on anything and will continue to monitor.

## 2022-12-13 NOTE — ASSESSMENT & PLAN NOTE
Mild edema noted of bilateral turbinates today without obvious drainage. Discussed use of nasal saline and use of a humidifier at night to help add moisture and use the Flonase only if she feels like her nose is swollen.

## 2023-01-11 ASSESSMENT — ENCOUNTER SYMPTOMS
RHINORRHEA: 1
DIARRHEA: 0
SHORTNESS OF BREATH: 0
VOMITING: 0
SINUS PRESSURE: 1
NAUSEA: 0

## 2023-01-11 NOTE — PROGRESS NOTES
[address], DO  [address] [facility], [place] [address], [address]  [phone]        Clark Mcallister is a 71 y.o. female who was seen by synchronous (real-time) audio-video technology on 1/12/2023. ASSESSMENT & PLAN:    Problem List Items Addressed This Visit          Respiratory    Acute sinusitis - Primary     Patient with 1-month of sinus congestion, postnasal drip and ear fullness. Denies fevers, has not had a significant amount of nasal drainage. Denies cough or chest congestion. Went to urgent care yesterday and got 7 days of prednisone, has not started it yet. Discussed taking it in the morning to help with congestion. We will send a prescription for amoxicillin to take if she starts having purulent drainage after taking the prednisone. Discussed importance of hydration. If not improving advised to call the office so that we can see her in person. Patient verbalizes understanding         Relevant Medications    amoxicillin (AMOXIL) 875 MG tablet    predniSONE (DELTASONE) 20 MG tablet        The diagnoses and plan were discussed with the patient, who verbalizes understanding and agrees with plan. All questions answered. Chief Complaint    Chief Complaint   Patient presents with    Congestion     Ongoing for about a month        HISTORY OF PRESENT ILLNESS    71 y.o. female with HTN presents today for virtual appointment for sinus congestion. Last seen one month ago for follow up of her BP medication. At the time complained of dry nasal mucosa but did not have any drainage. Discussed use of nasal saline and humidifier to help with moisture and only use Flonase as needed. States that she has had some postnasal drip and congestion for the last month. Notes that she went to urgent care yesterday and got a prescription for Prednisone for once a day for seven days. Denies fevers. Sometimes feels like her ears are clogged.   Notes that her entire head feels stopped up but denies a cough or wheezing.       PAST MEDICAL HISTORY    Past Medical History:   Diagnosis Date    Hypertension     Map-dot-fingerprint corneal dystrophy     Dr. Demi Anguiano    Mixed hyperlipidemia 2013       PAST SURGICAL HISTORY    Past Surgical History:   Procedure Laterality Date    BREAST SURGERY  03    CYST REMOVAL  93    RLQ removed at the time of the hernia repair    HERNIA REPAIR  93    rt side    HYSTEROSCOPY, STERILIZE W IMPLANTS  99    KNEE ARTHROSCOPY  1986    rt knee    OTHER SURGICAL HISTORY  98,99    stromal pucture rt eye for Map Dot Finger print dystrophy       FAMILY HISTORY    Family History   Problem Relation Age of Onset    Heart Attack Father         age 28     Cancer Mother         breast  later in life    High Cholesterol Sister     High Cholesterol Brother        SOCIAL HISTORY    Social History     Socioeconomic History    Marital status:      Spouse name: None    Number of children: None    Years of education: None    Highest education level: None   Tobacco Use    Smoking status: Never    Smokeless tobacco: Never   Substance and Sexual Activity    Alcohol use: Yes     Comment: occ    Drug use: No    Sexual activity: Yes     Comment: spouse     Social Determinants of Health     Financial Resource Strain: Low Risk     Difficulty of Paying Living Expenses: Not hard at all   Food Insecurity: No Food Insecurity    Worried About Running Out of Food in the Last Year: Never true    Ran Out of Food in the Last Year: Never true       MEDICATIONS      Current Outpatient Medications:     amoxicillin (AMOXIL) 875 MG tablet, Take 1 tablet by mouth 2 times daily for 10 days, Disp: 20 tablet, Rfl: 0    hydroCHLOROthiazide (HYDRODIURIL) 25 MG tablet, Take 1 tablet by mouth every morning, Disp: 90 tablet, Rfl: 5    rosuvastatin (CRESTOR) 5 MG tablet, Take 1 tablet by mouth daily, Disp: 90 tablet, Rfl: 5 DHA-EPA-Flaxseed Oil-Vitamin E (THERA TEARS NUTRITION PO), Take by mouth, Disp: , Rfl:     calcium citrate-vitamin D (CITRACAL+D) 315-200 MG-UNIT per tablet, Take 1 tablet by mouth daily (with breakfast), Disp: , Rfl:     carboxymethylcellulose (THERATEARS) 1 % ophthalmic gel, Apply to eye, Disp: , Rfl:     Cholecalciferol 50 MCG (2000 UT) TABS, Take by mouth, Disp: , Rfl:     Cyanocobalamin 1000 MCG SUBL, Take 1,000 mcg by mouth daily, Disp: , Rfl:     estradiol (ESTRACE) 0.1 MG/GM vaginal cream, Place 2 g vaginally as needed, Disp: , Rfl:     olopatadine (PATADAY) 0.2 % SOLN ophthalmic solution, Apply 1 drop to eye daily, Disp: , Rfl:     predniSONE (DELTASONE) 20 MG tablet, , Disp: , Rfl:     ALLERGIES / INTOLERANCES    Allergies   Allergen Reactions    Atorvastatin Other (See Comments)    Erythromycin Nausea And Vomiting       REVIEW OF SYSTEMS    Review of Systems   Constitutional:  Negative for fever. HENT:  Positive for congestion, postnasal drip, rhinorrhea and sinus pressure. Respiratory:  Negative for cough and shortness of breath. Cardiovascular:  Negative for chest pain. Gastrointestinal:  Negative for diarrhea, nausea and vomiting. There were no vitals filed for this visit. General: 71 y.o. female who is in no acute distress. Head: Normocephalic, atraumatic  EYES: EOMI   ENT: no hearing deficits noted, no nasal abnormalities noted, moist mucous membranes   Neck: normal range of motion. Lungs: No acute distress, no audible wheezing. Gastrointestinal: normal on visual inspection  Extremities: no cyanosis, no edema, normal range of motion  Neuro: Alert, awake. Normal gait, muscle strength within normal limits  Psych:  Appropriate, normal mood and affect        Due to this being a TeleHealth evaluation, many elements of the physical examination are unable to be assessed. Dee Ayala, was evaluated through a synchronous (real-time) audio-video encounter.  The patient (or guardian if applicable) is aware that this is a billable service, which includes applicable co-pays. This Virtual Visit was conducted with patient's (and/or legal guardian's) consent. The visit was conducted pursuant to the emergency declaration under the [address] [facility], [address] [place] [address] authority and the [employer] and [employer] Act. Patient identification was verified, and a caregiver was present when appropriate. The patient was located at Home: [address] [place] [id number]. Provider was located at [facility] ([address] [place] [address]): [id number] [place] [address],  [address] [facility].        Flavio Marshall DO  9:14 AM  01/12/23

## 2023-01-12 ENCOUNTER — TELEMEDICINE (OUTPATIENT)
Dept: PRIMARY CARE CLINIC | Facility: CLINIC | Age: 73
End: 2023-01-12
Payer: COMMERCIAL

## 2023-01-12 DIAGNOSIS — J01.90 ACUTE SINUSITIS, RECURRENCE NOT SPECIFIED, UNSPECIFIED LOCATION: Primary | ICD-10-CM

## 2023-01-12 PROCEDURE — 1123F ACP DISCUSS/DSCN MKR DOCD: CPT | Performed by: FAMILY MEDICINE

## 2023-01-12 PROCEDURE — 99213 OFFICE O/P EST LOW 20 MIN: CPT | Performed by: FAMILY MEDICINE

## 2023-01-12 RX ORDER — AMOXICILLIN 875 MG/1
875 TABLET, COATED ORAL 2 TIMES DAILY
Qty: 20 TABLET | Refills: 0 | Status: SHIPPED | OUTPATIENT
Start: 2023-01-12 | End: 2023-01-22

## 2023-01-12 RX ORDER — PREDNISONE 20 MG/1
TABLET ORAL
COMMUNITY
Start: 2023-01-11

## 2023-01-12 SDOH — ECONOMIC STABILITY: FOOD INSECURITY: WITHIN THE PAST 12 MONTHS, YOU WORRIED THAT YOUR FOOD WOULD RUN OUT BEFORE YOU GOT MONEY TO BUY MORE.: NEVER TRUE

## 2023-01-12 SDOH — ECONOMIC STABILITY: FOOD INSECURITY: WITHIN THE PAST 12 MONTHS, THE FOOD YOU BOUGHT JUST DIDN'T LAST AND YOU DIDN'T HAVE MONEY TO GET MORE.: NEVER TRUE

## 2023-01-12 ASSESSMENT — ANXIETY QUESTIONNAIRES
5. BEING SO RESTLESS THAT IT IS HARD TO SIT STILL: 0
1. FEELING NERVOUS, ANXIOUS, OR ON EDGE: 0
7. FEELING AFRAID AS IF SOMETHING AWFUL MIGHT HAPPEN: 0
3. WORRYING TOO MUCH ABOUT DIFFERENT THINGS: 0
4. TROUBLE RELAXING: 0
GAD7 TOTAL SCORE: 0
2. NOT BEING ABLE TO STOP OR CONTROL WORRYING: 0
IF YOU CHECKED OFF ANY PROBLEMS ON THIS QUESTIONNAIRE, HOW DIFFICULT HAVE THESE PROBLEMS MADE IT FOR YOU TO DO YOUR WORK, TAKE CARE OF THINGS AT HOME, OR GET ALONG WITH OTHER PEOPLE: NOT DIFFICULT AT ALL
6. BECOMING EASILY ANNOYED OR IRRITABLE: 0

## 2023-01-12 ASSESSMENT — PATIENT HEALTH QUESTIONNAIRE - PHQ9
SUM OF ALL RESPONSES TO PHQ QUESTIONS 1-9: 0
SUM OF ALL RESPONSES TO PHQ QUESTIONS 1-9: 0
1. LITTLE INTEREST OR PLEASURE IN DOING THINGS: 0
2. FEELING DOWN, DEPRESSED OR HOPELESS: 0
SUM OF ALL RESPONSES TO PHQ9 QUESTIONS 1 & 2: 0
SUM OF ALL RESPONSES TO PHQ QUESTIONS 1-9: 0
SUM OF ALL RESPONSES TO PHQ QUESTIONS 1-9: 0

## 2023-01-12 ASSESSMENT — SOCIAL DETERMINANTS OF HEALTH (SDOH): HOW HARD IS IT FOR YOU TO PAY FOR THE VERY BASICS LIKE FOOD, HOUSING, MEDICAL CARE, AND HEATING?: NOT HARD AT ALL

## 2023-01-12 ASSESSMENT — ENCOUNTER SYMPTOMS: COUGH: 0

## 2023-01-12 NOTE — PATIENT INSTRUCTIONS
It was great to see you today! I have sent a prescription for amoxicillin to your pharmacy. Start taking the prednisone once a day in the morning with breakfast.  This should help with the inflammation in your sinuses. If you start having thick discolored drainage, start the amoxicillin. Take it twice a day with food. You can take it at the same time as the prednisone as long as you take both medications with food. Make sure to drink plenty of water. The nasal saline may help if you start draining.     If you do not feel that you are getting better, please call our office is [phone] so that we can see you in person

## 2023-01-12 NOTE — ASSESSMENT & PLAN NOTE
Patient with 1-month of sinus congestion, postnasal drip and ear fullness. Denies fevers, has not had a significant amount of nasal drainage. Denies cough or chest congestion. Went to urgent care yesterday and got 7 days of prednisone, has not started it yet. Discussed taking it in the morning to help with congestion. We will send a prescription for amoxicillin to take if she starts having purulent drainage after taking the prednisone. Discussed importance of hydration. If not improving advised to call the office so that we can see her in person.   Patient verbalizes understanding

## 2023-06-13 PROBLEM — J98.8 CONGESTION OF UPPER AIRWAY: Status: ACTIVE | Noted: 2023-06-13

## 2023-07-20 ENCOUNTER — COMMUNITY OUTREACH (OUTPATIENT)
Dept: PRIMARY CARE CLINIC | Facility: CLINIC | Age: 73
End: 2023-07-20

## 2023-07-31 ENCOUNTER — OFFICE VISIT (OUTPATIENT)
Dept: PRIMARY CARE CLINIC | Facility: CLINIC | Age: 73
End: 2023-07-31
Payer: COMMERCIAL

## 2023-07-31 VITALS
HEART RATE: 78 BPM | SYSTOLIC BLOOD PRESSURE: 130 MMHG | OXYGEN SATURATION: 98 % | BODY MASS INDEX: 28.39 KG/M2 | HEIGHT: 64 IN | RESPIRATION RATE: 16 BRPM | WEIGHT: 166.3 LBS | DIASTOLIC BLOOD PRESSURE: 84 MMHG

## 2023-07-31 DIAGNOSIS — B37.31 YEAST INFECTION INVOLVING THE VAGINA AND SURROUNDING AREA: Primary | ICD-10-CM

## 2023-07-31 PROCEDURE — 99213 OFFICE O/P EST LOW 20 MIN: CPT | Performed by: FAMILY MEDICINE

## 2023-07-31 PROCEDURE — 3075F SYST BP GE 130 - 139MM HG: CPT | Performed by: FAMILY MEDICINE

## 2023-07-31 PROCEDURE — 3079F DIAST BP 80-89 MM HG: CPT | Performed by: FAMILY MEDICINE

## 2023-07-31 PROCEDURE — 1123F ACP DISCUSS/DSCN MKR DOCD: CPT | Performed by: FAMILY MEDICINE

## 2023-07-31 RX ORDER — FLUCONAZOLE 150 MG/1
150 TABLET ORAL EVERY OTHER DAY
Qty: 2 TABLET | Refills: 0 | Status: SHIPPED | OUTPATIENT
Start: 2023-07-31 | End: 2023-08-03

## 2023-07-31 ASSESSMENT — ENCOUNTER SYMPTOMS
VOMITING: 0
SHORTNESS OF BREATH: 0
ABDOMINAL PAIN: 0
NAUSEA: 0
DIARRHEA: 0
COUGH: 0

## 2023-07-31 NOTE — PATIENT INSTRUCTIONS
IT WAS GREAT TO SEE YOU TODAY! PLEASE TAKE ALL MEDICATION AS DISCUSSED.     ~TAKE THE DIFLUCAN TODAY, WAIT 2-3 DAYS THEN TAKE THE SECOND PILL    ~APPLY THE MICONAZOLE TO THE EXTERIOR OF YOUR LABIA AND TOWARD THE BACK    ~DO NOT SCRUB OR SCRATCH, YOU CAN TAKE AN ALLERGY PILL TO HELP WITH ITCHING    I WILL SEE YOU AGAIN IN 2 WEEKS BUT PLEASE CALL WITH CONCERNS [phone]

## 2023-07-31 NOTE — ASSESSMENT & PLAN NOTE
Patient with visible erythema and irritation of labia and perineum with no obvious vaginal discharge.   We will treat with Diflucan and miconazole cream.

## 2023-08-28 ENCOUNTER — OFFICE VISIT (OUTPATIENT)
Age: 73
End: 2023-08-28
Payer: COMMERCIAL

## 2023-08-28 VITALS
DIASTOLIC BLOOD PRESSURE: 88 MMHG | BODY MASS INDEX: 28 KG/M2 | HEIGHT: 64 IN | SYSTOLIC BLOOD PRESSURE: 132 MMHG | HEART RATE: 66 BPM | WEIGHT: 164 LBS

## 2023-08-28 DIAGNOSIS — E78.2 MIXED HYPERLIPIDEMIA: ICD-10-CM

## 2023-08-28 DIAGNOSIS — R94.31 EKG, ABNORMAL: ICD-10-CM

## 2023-08-28 DIAGNOSIS — I34.0 NONRHEUMATIC MITRAL VALVE REGURGITATION: ICD-10-CM

## 2023-08-28 DIAGNOSIS — I10 PRIMARY HYPERTENSION: Primary | ICD-10-CM

## 2023-08-28 PROCEDURE — 1123F ACP DISCUSS/DSCN MKR DOCD: CPT | Performed by: INTERNAL MEDICINE

## 2023-08-28 PROCEDURE — 93000 ELECTROCARDIOGRAM COMPLETE: CPT | Performed by: INTERNAL MEDICINE

## 2023-08-28 PROCEDURE — 3079F DIAST BP 80-89 MM HG: CPT | Performed by: INTERNAL MEDICINE

## 2023-08-28 PROCEDURE — 3075F SYST BP GE 130 - 139MM HG: CPT | Performed by: INTERNAL MEDICINE

## 2023-08-28 PROCEDURE — 99214 OFFICE O/P EST MOD 30 MIN: CPT | Performed by: INTERNAL MEDICINE

## 2023-08-28 NOTE — PROGRESS NOTES
regurgitation    EKG, abnormal    Mixed hyperlipidemia          PLAN:   HTN: Off Ace now, on HCTZ 25 in AM, better now. BP better now, feeling well on less meds. Feeling better on 25 daily. MR:  follow, echo in 2-3 yr. HPL: on crestor. The patient has been instructed to call with any angina or equivalent as reviewed today. All questions were answered with the patient voicing complete understanding. Patient has been instructed and agrees to call our office with any issues or other concerns related to their cardiac condition(s) and/or complaint(s). Return in about 6 months (around 2/28/2024).        [facility], DO  8/28/2023

## 2023-09-25 NOTE — PROGRESS NOTES
Findings: No rash. Neurological:      General: No focal deficit present. Mental Status: She is alert. Psychiatric:         Mood and Affect: Mood normal.         Behavior: Behavior normal.            Allergies   Allergen Reactions    Atorvastatin Other (See Comments)    Erythromycin Nausea And Vomiting     Prior to Visit Medications    Medication Sig Taking?  Authorizing Provider   rosuvastatin (CRESTOR) 5 MG tablet Take 1 tablet by mouth daily Yes Daniel Parents Marky, DO   hydroCHLOROthiazide (HYDRODIURIL) 25 MG tablet Take 1 tablet by mouth every morning Yes Daniel Parents Marky, DO   predniSONE (DELTASONE) 20 MG tablet  Yes Historical Provider, MD   DHA-EPA-Flaxseed Oil-Vitamin E (THERA TEARS NUTRITION PO) Take by mouth Yes Historical Provider, MD   calcium citrate-vitamin D (CITRACAL+D) 315-200 MG-UNIT per tablet Take 1 tablet by mouth daily (with breakfast) Yes Ar Automatic Reconciliation   carboxymethylcellulose (THERATEARS) 1 % ophthalmic gel Apply to eye Yes Ar Automatic Reconciliation   Cholecalciferol 50 MCG (2000 UT) TABS Take by mouth Yes Ar Automatic Reconciliation   Cyanocobalamin 1000 MCG SUBL Take 1,000 mcg by mouth daily Yes Ar Automatic Reconciliation   estradiol (ESTRACE) 0.1 MG/GM vaginal cream Place 2 g vaginally as needed Yes Ar Automatic Reconciliation   olopatadine (PATADAY) 0.2 % SOLN ophthalmic solution Apply 1 drop to eye daily Yes Ar Automatic Reconciliation     RESULTS    Lab Results   Component Value Date    CHOL 193 11/02/2022    CHOL 179 09/28/2021    CHOL 270 (H) 10/06/2020     Lab Results   Component Value Date    TRIG 66 11/02/2022    TRIG 82 09/28/2021    TRIG 95 10/06/2020     Lab Results   Component Value Date    HDL 92 (H) 11/02/2022    HDL 74 09/28/2021    HDL 80 10/06/2020     Lab Results   Component Value Date    LDLCALC 87.8 11/02/2022    LDLCALC 90 09/28/2021    LDLCALC 174 (H) 10/06/2020     Lab Results   Component Value Date    LABVLDL 13.2 11/02/2022

## 2023-09-26 ENCOUNTER — OFFICE VISIT (OUTPATIENT)
Dept: PRIMARY CARE CLINIC | Facility: CLINIC | Age: 73
End: 2023-09-26
Payer: COMMERCIAL

## 2023-09-26 VITALS
OXYGEN SATURATION: 94 % | WEIGHT: 162.8 LBS | HEIGHT: 64 IN | BODY MASS INDEX: 27.79 KG/M2 | DIASTOLIC BLOOD PRESSURE: 72 MMHG | SYSTOLIC BLOOD PRESSURE: 132 MMHG | RESPIRATION RATE: 17 BRPM | HEART RATE: 65 BPM

## 2023-09-26 DIAGNOSIS — E78.2 MIXED HYPERLIPIDEMIA: ICD-10-CM

## 2023-09-26 DIAGNOSIS — I10 PRIMARY HYPERTENSION: ICD-10-CM

## 2023-09-26 DIAGNOSIS — M85.852 OSTEOPENIA OF LEFT HIP: ICD-10-CM

## 2023-09-26 DIAGNOSIS — Z00.00 MEDICARE ANNUAL WELLNESS VISIT, SUBSEQUENT: Primary | ICD-10-CM

## 2023-09-26 DIAGNOSIS — J06.9 VIRAL UPPER RESPIRATORY TRACT INFECTION: ICD-10-CM

## 2023-09-26 LAB
25(OH)D3 SERPL-MCNC: 40.3 NG/ML (ref 30–100)
ALBUMIN SERPL-MCNC: 4 G/DL (ref 3.2–4.6)
ALBUMIN/GLOB SERPL: 1.3 (ref 0.4–1.6)
ALP SERPL-CCNC: 53 U/L (ref 50–136)
ALT SERPL-CCNC: 25 U/L (ref 12–65)
ANION GAP SERPL CALC-SCNC: 8 MMOL/L (ref 2–11)
AST SERPL-CCNC: 13 U/L (ref 15–37)
BASOPHILS # BLD: 0 K/UL (ref 0–0.2)
BASOPHILS NFR BLD: 0 % (ref 0–2)
BILIRUB SERPL-MCNC: 0.6 MG/DL (ref 0.2–1.1)
BUN SERPL-MCNC: 10 MG/DL (ref 8–23)
CALCIUM SERPL-MCNC: 9.6 MG/DL (ref 8.3–10.4)
CHLORIDE SERPL-SCNC: 102 MMOL/L (ref 101–110)
CHOLEST SERPL-MCNC: 199 MG/DL
CO2 SERPL-SCNC: 29 MMOL/L (ref 21–32)
CREAT SERPL-MCNC: 0.9 MG/DL (ref 0.6–1)
DIFFERENTIAL METHOD BLD: ABNORMAL
EOSINOPHIL # BLD: 0.1 K/UL (ref 0–0.8)
EOSINOPHIL NFR BLD: 1 % (ref 0.5–7.8)
ERYTHROCYTE [DISTWIDTH] IN BLOOD BY AUTOMATED COUNT: 13.9 % (ref 11.9–14.6)
GLOBULIN SER CALC-MCNC: 3 G/DL (ref 2.8–4.5)
GLUCOSE SERPL-MCNC: 105 MG/DL (ref 65–100)
HCT VFR BLD AUTO: 44.4 % (ref 35.8–46.3)
HDLC SERPL-MCNC: 101 MG/DL (ref 40–60)
HDLC SERPL: 2
HGB BLD-MCNC: 14.8 G/DL (ref 11.7–15.4)
IMM GRANULOCYTES # BLD AUTO: 0 K/UL (ref 0–0.5)
IMM GRANULOCYTES NFR BLD AUTO: 0 % (ref 0–5)
LDLC SERPL CALC-MCNC: 82.6 MG/DL
LYMPHOCYTES # BLD: 1.8 K/UL (ref 0.5–4.6)
LYMPHOCYTES NFR BLD: 25 % (ref 13–44)
MCH RBC QN AUTO: 33.3 PG (ref 26.1–32.9)
MCHC RBC AUTO-ENTMCNC: 33.3 G/DL (ref 31.4–35)
MCV RBC AUTO: 99.8 FL (ref 82–102)
MONOCYTES # BLD: 0.5 K/UL (ref 0.1–1.3)
MONOCYTES NFR BLD: 7 % (ref 4–12)
NEUTS SEG # BLD: 5 K/UL (ref 1.7–8.2)
NEUTS SEG NFR BLD: 67 % (ref 43–78)
NRBC # BLD: 0 K/UL (ref 0–0.2)
PLATELET # BLD AUTO: 248 K/UL (ref 150–450)
PMV BLD AUTO: 9.6 FL (ref 9.4–12.3)
POTASSIUM SERPL-SCNC: 3.8 MMOL/L (ref 3.5–5.1)
PROT SERPL-MCNC: 7 G/DL (ref 6.3–8.2)
RBC # BLD AUTO: 4.45 M/UL (ref 4.05–5.2)
SODIUM SERPL-SCNC: 139 MMOL/L (ref 133–143)
TRIGL SERPL-MCNC: 77 MG/DL (ref 35–150)
VLDLC SERPL CALC-MCNC: 15.4 MG/DL (ref 6–23)
WBC # BLD AUTO: 7.4 K/UL (ref 4.3–11.1)

## 2023-09-26 PROCEDURE — 1123F ACP DISCUSS/DSCN MKR DOCD: CPT | Performed by: FAMILY MEDICINE

## 2023-09-26 PROCEDURE — 3075F SYST BP GE 130 - 139MM HG: CPT | Performed by: FAMILY MEDICINE

## 2023-09-26 PROCEDURE — G0439 PPPS, SUBSEQ VISIT: HCPCS | Performed by: FAMILY MEDICINE

## 2023-09-26 PROCEDURE — 99214 OFFICE O/P EST MOD 30 MIN: CPT | Performed by: FAMILY MEDICINE

## 2023-09-26 PROCEDURE — 3078F DIAST BP <80 MM HG: CPT | Performed by: FAMILY MEDICINE

## 2023-09-26 ASSESSMENT — PATIENT HEALTH QUESTIONNAIRE - PHQ9
2. FEELING DOWN, DEPRESSED OR HOPELESS: 0
1. LITTLE INTEREST OR PLEASURE IN DOING THINGS: 0
SUM OF ALL RESPONSES TO PHQ QUESTIONS 1-9: 0
SUM OF ALL RESPONSES TO PHQ9 QUESTIONS 1 & 2: 0
SUM OF ALL RESPONSES TO PHQ QUESTIONS 1-9: 0

## 2023-09-26 NOTE — ASSESSMENT & PLAN NOTE
Patient with some left sinus tenderness, no signs of bacterial infection today. Recommend use of her Flonase and can use an OTC medicine like Flonase sinus to help with sinus pressure.

## 2023-09-27 ENCOUNTER — TELEPHONE (OUTPATIENT)
Dept: PRIMARY CARE CLINIC | Facility: CLINIC | Age: 73
End: 2023-09-27

## 2023-10-26 PROBLEM — Z00.00 MEDICARE ANNUAL WELLNESS VISIT, SUBSEQUENT: Status: RESOLVED | Noted: 2017-07-20 | Resolved: 2023-10-26

## 2024-01-15 ENCOUNTER — TELEPHONE (OUTPATIENT)
Dept: PRIMARY CARE CLINIC | Facility: CLINIC | Age: 74
End: 2024-01-15

## 2024-01-15 RX ORDER — AMOXICILLIN 875 MG/1
875 TABLET, COATED ORAL 2 TIMES DAILY
Qty: 20 TABLET | Refills: 0 | Status: SHIPPED | OUTPATIENT
Start: 2024-01-15 | End: 2024-01-25

## 2024-01-15 NOTE — TELEPHONE ENCOUNTER
Patient stated that her sinus infection returned a week ago, and has been taking advil, but hasn't cleared up. Is requesting the same medication you prescribed her last time. Symptoms are headache, sinus pressure, left nostril.

## 2024-02-17 ASSESSMENT — PATIENT HEALTH QUESTIONNAIRE - PHQ9
SUM OF ALL RESPONSES TO PHQ QUESTIONS 1-9: 0
2. FEELING DOWN, DEPRESSED OR HOPELESS: 0
SUM OF ALL RESPONSES TO PHQ9 QUESTIONS 1 & 2: 0
2. FEELING DOWN, DEPRESSED OR HOPELESS: NOT AT ALL
1. LITTLE INTEREST OR PLEASURE IN DOING THINGS: 0
SUM OF ALL RESPONSES TO PHQ9 QUESTIONS 1 & 2: 0
1. LITTLE INTEREST OR PLEASURE IN DOING THINGS: NOT AT ALL
SUM OF ALL RESPONSES TO PHQ QUESTIONS 1-9: 0

## 2024-02-20 ENCOUNTER — OFFICE VISIT (OUTPATIENT)
Dept: PRIMARY CARE CLINIC | Facility: CLINIC | Age: 74
End: 2024-02-20
Payer: COMMERCIAL

## 2024-02-20 VITALS
SYSTOLIC BLOOD PRESSURE: 138 MMHG | OXYGEN SATURATION: 98 % | WEIGHT: 166.8 LBS | HEART RATE: 74 BPM | TEMPERATURE: 97.1 F | HEIGHT: 64 IN | BODY MASS INDEX: 28.48 KG/M2 | DIASTOLIC BLOOD PRESSURE: 74 MMHG

## 2024-02-20 DIAGNOSIS — I10 PRIMARY HYPERTENSION: ICD-10-CM

## 2024-02-20 DIAGNOSIS — E78.2 MIXED HYPERLIPIDEMIA: ICD-10-CM

## 2024-02-20 DIAGNOSIS — S99.929A INJURY OF NAIL BED OF TOE: ICD-10-CM

## 2024-02-20 DIAGNOSIS — J01.11 ACUTE RECURRENT FRONTAL SINUSITIS: Primary | ICD-10-CM

## 2024-02-20 PROCEDURE — 1123F ACP DISCUSS/DSCN MKR DOCD: CPT | Performed by: FAMILY MEDICINE

## 2024-02-20 PROCEDURE — 3075F SYST BP GE 130 - 139MM HG: CPT | Performed by: FAMILY MEDICINE

## 2024-02-20 PROCEDURE — 99214 OFFICE O/P EST MOD 30 MIN: CPT | Performed by: FAMILY MEDICINE

## 2024-02-20 PROCEDURE — 3078F DIAST BP <80 MM HG: CPT | Performed by: FAMILY MEDICINE

## 2024-02-20 RX ORDER — HYDROCHLOROTHIAZIDE 25 MG/1
25 TABLET ORAL EVERY MORNING
Qty: 90 TABLET | Refills: 5 | Status: SHIPPED | OUTPATIENT
Start: 2024-02-20

## 2024-02-20 RX ORDER — FLUCONAZOLE 150 MG/1
150 TABLET ORAL ONCE
Qty: 1 TABLET | Refills: 0 | Status: SHIPPED | OUTPATIENT
Start: 2024-02-20 | End: 2024-02-20

## 2024-02-20 RX ORDER — ROSUVASTATIN CALCIUM 5 MG/1
5 TABLET, COATED ORAL DAILY
Qty: 90 TABLET | Refills: 5 | Status: SHIPPED | OUTPATIENT
Start: 2024-02-20

## 2024-02-20 RX ORDER — DOXYCYCLINE HYCLATE 100 MG
100 TABLET ORAL 2 TIMES DAILY
Qty: 20 TABLET | Refills: 0 | Status: SHIPPED | OUTPATIENT
Start: 2024-02-20 | End: 2024-03-01

## 2024-02-20 RX ORDER — VITAMIN B COMPLEX
1 CAPSULE ORAL DAILY
COMMUNITY

## 2024-02-20 SDOH — ECONOMIC STABILITY: INCOME INSECURITY: HOW HARD IS IT FOR YOU TO PAY FOR THE VERY BASICS LIKE FOOD, HOUSING, MEDICAL CARE, AND HEATING?: PATIENT DECLINED

## 2024-02-20 SDOH — ECONOMIC STABILITY: FOOD INSECURITY: WITHIN THE PAST 12 MONTHS, THE FOOD YOU BOUGHT JUST DIDN'T LAST AND YOU DIDN'T HAVE MONEY TO GET MORE.: PATIENT DECLINED

## 2024-02-20 SDOH — ECONOMIC STABILITY: HOUSING INSECURITY
IN THE LAST 12 MONTHS, WAS THERE A TIME WHEN YOU DID NOT HAVE A STEADY PLACE TO SLEEP OR SLEPT IN A SHELTER (INCLUDING NOW)?: PATIENT DECLINED

## 2024-02-20 SDOH — ECONOMIC STABILITY: FOOD INSECURITY: WITHIN THE PAST 12 MONTHS, YOU WORRIED THAT YOUR FOOD WOULD RUN OUT BEFORE YOU GOT MONEY TO BUY MORE.: PATIENT DECLINED

## 2024-02-20 ASSESSMENT — PATIENT HEALTH QUESTIONNAIRE - PHQ9
SUM OF ALL RESPONSES TO PHQ QUESTIONS 1-9: 0
1. LITTLE INTEREST OR PLEASURE IN DOING THINGS: 0
SUM OF ALL RESPONSES TO PHQ9 QUESTIONS 1 & 2: 0
2. FEELING DOWN, DEPRESSED OR HOPELESS: 0
SUM OF ALL RESPONSES TO PHQ QUESTIONS 1-9: 0

## 2024-02-20 ASSESSMENT — ENCOUNTER SYMPTOMS
NAUSEA: 0
SHORTNESS OF BREATH: 0
ABDOMINAL PAIN: 0
SINUS PRESSURE: 1
DIARRHEA: 0
VOMITING: 0
RHINORRHEA: 1
COUGH: 0
COLOR CHANGE: 1

## 2024-02-20 NOTE — ASSESSMENT & PLAN NOTE
Great toenail slowly growing out, appears hypertrophic.  Patient declines Podiatry referral, discussed trimming as it grows out and will continue to follow.

## 2024-02-20 NOTE — ASSESSMENT & PLAN NOTE
No improvement with OTC medications and Amoxicillin last month.  Will treat with Doxycycline (and Diflucan as needed).  Discussed taking it with a full glass of water and stay upright for an hour afterward to avoid any esophageal irritation.

## 2024-02-20 NOTE — PATIENT INSTRUCTIONS
IT WAS GREAT TO SEE YOU TODAY!    DRINK LOTS OF WATER.    PLEASE TAKE ALL MEDICATION AS DISCUSSED.    ~TAKE THE DOXYCYCLINE TWICE A DAY WITH FOOD (NO DAIRY) AND DRINK A FULL GLASS OF WATER WITH IT.  MAKE SURE TO STAY SITTING UPRIGHT FOR AN HOUR AFTERWARD TO AVOID ANY ESOPHAGEAL IRRITATION.    ~USE THE DIFLUCAN IF NEEDED FOR YEAST INFECTION.    I WILL SEE YOU AGAIN IN 3 MONTHS BUT PLEASE CALL WITH CONCERNS [phone]

## 2024-02-20 NOTE — PROGRESS NOTES
TRIG 77 09/26/2023    TRIG 66 11/02/2022    TRIG 82 09/28/2021     Lab Results   Component Value Date     (H) 09/26/2023    HDL 92 (H) 11/02/2022    HDL 74 09/28/2021     Lab Results   Component Value Date    LDLCALC 82.6 09/26/2023    LDLCALC 87.8 11/02/2022    LDLCALC 90 09/28/2021     Lab Results   Component Value Date    VLDL 15 09/28/2021    VLDL 16 10/06/2020     Lab Results   Component Value Date    CHOLHDLRATIO 2.0 09/26/2023    CHOLHDLRATIO 2.1 11/02/2022     Lab Results   Component Value Date     09/26/2023    K 3.8 09/26/2023     09/26/2023    CO2 29 09/26/2023    BUN 10 09/26/2023    CREATININE 0.90 09/26/2023    GLUCOSE 105 (H) 09/26/2023    CALCIUM 9.6 09/26/2023    PROT 7.0 09/26/2023    LABALBU 4.0 09/26/2023    BILITOT 0.6 09/26/2023    ALKPHOS 53 09/26/2023    AST 13 (L) 09/26/2023    ALT 25 09/26/2023    LABGLOM >60 09/26/2023    GFRAA 86 09/28/2021    AGRATIO 1.3 09/26/2023    GLOB 3.0 09/26/2023       Lab Results   Component Value Date    WBC 7.4 09/26/2023    HGB 14.8 09/26/2023    HCT 44.4 09/26/2023    MCV 99.8 09/26/2023     09/26/2023     Lab Results   Component Value Date/Time    VITD25 40.3 09/26/2023 09:51 AM          Farideh Negro DO  12:53 PM  02/20/24

## 2024-02-27 ENCOUNTER — OFFICE VISIT (OUTPATIENT)
Age: 74
End: 2024-02-27
Payer: COMMERCIAL

## 2024-02-27 VITALS
SYSTOLIC BLOOD PRESSURE: 138 MMHG | HEIGHT: 64 IN | WEIGHT: 166.4 LBS | DIASTOLIC BLOOD PRESSURE: 86 MMHG | HEART RATE: 88 BPM | BODY MASS INDEX: 28.41 KG/M2

## 2024-02-27 DIAGNOSIS — I34.0 NONRHEUMATIC MITRAL VALVE REGURGITATION: Primary | ICD-10-CM

## 2024-02-27 DIAGNOSIS — R94.31 EKG, ABNORMAL: ICD-10-CM

## 2024-02-27 DIAGNOSIS — I10 PRIMARY HYPERTENSION: ICD-10-CM

## 2024-02-27 DIAGNOSIS — E78.2 MIXED HYPERLIPIDEMIA: ICD-10-CM

## 2024-02-27 PROCEDURE — 3075F SYST BP GE 130 - 139MM HG: CPT | Performed by: INTERNAL MEDICINE

## 2024-02-27 PROCEDURE — 3079F DIAST BP 80-89 MM HG: CPT | Performed by: INTERNAL MEDICINE

## 2024-02-27 PROCEDURE — 99214 OFFICE O/P EST MOD 30 MIN: CPT | Performed by: INTERNAL MEDICINE

## 2024-02-27 PROCEDURE — 1123F ACP DISCUSS/DSCN MKR DOCD: CPT | Performed by: INTERNAL MEDICINE

## 2024-02-27 NOTE — PROGRESS NOTES
fatigue 2022     Priority: Medium    Shortness of breath 2022     Priority: Medium    EKG, abnormal 2022     Priority: Medium    Acute recurrent frontal sinusitis 2024    Osteopenia of left hip 10/29/2018     DEXA Oct 18        Atrophic vaginitis 2016    Glaucoma suspect 2016    Mixed hyperlipidemia 2013    Hypertension       Past Surgical History:   Procedure Laterality Date    BREAST SURGERY  03    CYST REMOVAL  93    RLQ removed at the time of the hernia repair    HERNIA REPAIR  93    rt side    HYSTEROSCOPY, STERILIZE W IMPLANTS  99    KNEE ARTHROSCOPY  1986    rt knee    OTHER SURGICAL HISTORY  98,99    stromal pucture rt eye for Map Dot Finger print dystrophy     Family History   Problem Relation Age of Onset    Heart Attack Father         age 35     Cancer Mother         breast  later in life    High Cholesterol Sister     High Cholesterol Brother      Social History     Tobacco Use    Smoking status: Never    Smokeless tobacco: Never   Substance Use Topics    Alcohol use: Yes     Comment: occ         ROS:    No obvious pertinent positives on review of systems except for what was outlined in the HPI today.      PHYSICAL EXAM:     /86   Pulse 88   Ht 1.626 m (5' 4\")   Wt 75.5 kg (166 lb 6.4 oz)   BMI 28.56 kg/m²    General/Constitutional:   Alert and oriented x 3, no acute distress  HEENT:   normocephalic, atraumatic, moist mucous membranes  Neck:   No JVD or carotid bruits bilaterally  Cardiovascular:   regular rate and rhythm, no murmur/rub/gallop appreciated  Pulmonary:   clear to auscultation bilaterally, no respiratory distress  Abdomen:   soft, non-tender, non-distended  Ext:   No sig LE edema bilaterally  Skin:  warm and dry, no obvious rashes seen  Neuro:   no obvious sensory or motor deficits  Psychiatric:   normal mood and affect      Lab Results   Component Value Date/Time     2023 09:51 AM    K

## 2024-05-14 ENCOUNTER — OFFICE VISIT (OUTPATIENT)
Dept: PRIMARY CARE CLINIC | Facility: CLINIC | Age: 74
End: 2024-05-14
Payer: MEDICARE

## 2024-05-14 VITALS
WEIGHT: 165 LBS | HEIGHT: 64 IN | HEART RATE: 75 BPM | SYSTOLIC BLOOD PRESSURE: 142 MMHG | BODY MASS INDEX: 28.17 KG/M2 | DIASTOLIC BLOOD PRESSURE: 82 MMHG | OXYGEN SATURATION: 97 %

## 2024-05-14 DIAGNOSIS — R10.9 LEFT FLANK PAIN: ICD-10-CM

## 2024-05-14 DIAGNOSIS — N95.2 ATROPHIC VAGINITIS: ICD-10-CM

## 2024-05-14 DIAGNOSIS — J32.9 CHRONIC SINUSITIS, UNSPECIFIED LOCATION: Primary | ICD-10-CM

## 2024-05-14 DIAGNOSIS — R35.0 FREQUENCY OF URINATION: ICD-10-CM

## 2024-05-14 PROBLEM — H18.599: Status: ACTIVE | Noted: 2018-10-08

## 2024-05-14 PROBLEM — H18.599: Status: ACTIVE | Noted: 2019-03-15

## 2024-05-14 PROBLEM — H10.13 PERENNIAL ALLERGIC CONJUNCTIVITIS OF BOTH EYES: Status: ACTIVE | Noted: 2019-04-08

## 2024-05-14 PROBLEM — H04.123 DRY EYES: Status: ACTIVE | Noted: 2018-10-08

## 2024-05-14 PROBLEM — H10.439 CHRONIC FOLLICULAR CONJUNCTIVITIS: Status: ACTIVE | Noted: 2019-03-15

## 2024-05-14 PROBLEM — H25.9 AGE-RELATED CATARACT OF BOTH EYES: Status: ACTIVE | Noted: 2018-10-08

## 2024-05-14 LAB
BILIRUBIN, URINE, POC: NEGATIVE
BLOOD URINE, POC: NEGATIVE
GLUCOSE URINE, POC: NEGATIVE
KETONES, URINE, POC: NEGATIVE
LEUKOCYTE ESTERASE, URINE, POC: NEGATIVE
NITRITE, URINE, POC: NEGATIVE
PH, URINE, POC: 7 (ref 4.6–8)
PROTEIN,URINE, POC: NEGATIVE
SPECIFIC GRAVITY, URINE, POC: 1.01 (ref 1–1.03)
URINALYSIS CLARITY, POC: CLEAR
URINALYSIS COLOR, POC: YELLOW
UROBILINOGEN, POC: NORMAL

## 2024-05-14 PROCEDURE — G8427 DOCREV CUR MEDS BY ELIG CLIN: HCPCS | Performed by: FAMILY MEDICINE

## 2024-05-14 PROCEDURE — G8399 PT W/DXA RESULTS DOCUMENT: HCPCS | Performed by: FAMILY MEDICINE

## 2024-05-14 PROCEDURE — 3017F COLORECTAL CA SCREEN DOC REV: CPT | Performed by: FAMILY MEDICINE

## 2024-05-14 PROCEDURE — 81002 URINALYSIS NONAUTO W/O SCOPE: CPT | Performed by: FAMILY MEDICINE

## 2024-05-14 PROCEDURE — G8419 CALC BMI OUT NRM PARAM NOF/U: HCPCS | Performed by: FAMILY MEDICINE

## 2024-05-14 PROCEDURE — 3077F SYST BP >= 140 MM HG: CPT | Performed by: FAMILY MEDICINE

## 2024-05-14 PROCEDURE — 3079F DIAST BP 80-89 MM HG: CPT | Performed by: FAMILY MEDICINE

## 2024-05-14 PROCEDURE — 1036F TOBACCO NON-USER: CPT | Performed by: FAMILY MEDICINE

## 2024-05-14 PROCEDURE — 99214 OFFICE O/P EST MOD 30 MIN: CPT | Performed by: FAMILY MEDICINE

## 2024-05-14 PROCEDURE — 1123F ACP DISCUSS/DSCN MKR DOCD: CPT | Performed by: FAMILY MEDICINE

## 2024-05-14 PROCEDURE — 1090F PRES/ABSN URINE INCON ASSESS: CPT | Performed by: FAMILY MEDICINE

## 2024-05-14 RX ORDER — LORATADINE 10 MG/1
10 TABLET ORAL DAILY
Qty: 90 TABLET | Refills: 1 | Status: SHIPPED | OUTPATIENT
Start: 2024-05-14

## 2024-05-14 ASSESSMENT — ENCOUNTER SYMPTOMS
NAUSEA: 0
VOMITING: 0
ABDOMINAL PAIN: 0
DIARRHEA: 0
SINUS PRESSURE: 1
COUGH: 0

## 2024-05-14 NOTE — PATIENT INSTRUCTIONS
IT WAS GREAT TO SEE YOU TODAY!    PLEASE TAKE ALL MEDICATION AS DISCUSSED.    ~TAKE A LORATADINE ONCE DAILY TO HELP WITH CONGESTION.    I REFERRED YOU TO ENT, THEY WILL CALL YOU ABOUT AN APPOINTMENT TO DISCUSS YOUR FREQUENT SINUS INFECTIONS.    USE A HEATING PAD ON YOUR SIDE TO HELP WITH PAIN.  ALSO DO STRETCHING TO HELP WITH THE MUSCLE TIGHTNESS.    I WILL SEE YOU AGAIN IN 3 MONTHS BUT PLEASE CALL WITH CONCERNS [phone]

## 2024-05-14 NOTE — ASSESSMENT & PLAN NOTE
Reports a little dryness, uses a vaginal cream.  Advised to use this if she notices discomfort, wash her hands and urinate to avoid a UTI.

## 2024-05-14 NOTE — PROGRESS NOTES
states that she has had a little pain on the left side.  Notes that it hurt before but not now.  Denies any falls or injuries.  Denies urinary symptoms.  Also notes a vaginal discomfort last week, notes some dryness.  Has a vaginal cream that she used and it has improved.  Denies discharge or bleeding.    PAST MEDICAL HISTORY    Past Medical History:   Diagnosis Date    Hypertension     ((Pt Qnr Sub: n/a))     Map-dot-fingerprint corneal dystrophy     Dr. Kye Foster    Mixed hyperlipidemia 2013       PAST SURGICAL HISTORY    Past Surgical History:   Procedure Laterality Date    BREAST SURGERY  03    CYST REMOVAL  93    RLQ removed at the time of the hernia repair    HERNIA REPAIR  93    rt side    HYSTEROSCOPY, STERILIZE W IMPLANTS  99    KNEE ARTHROSCOPY  1986    rt knee    OTHER SURGICAL HISTORY  98,99    stromal pucture rt eye for Map Dot Finger print dystrophy       FAMILY HISTORY    Family History   Problem Relation Age of Onset    Heart Attack Father         age 35     Cancer Mother         breast  later in life    High Cholesterol Sister     High Cholesterol Brother        SOCIAL HISTORY    Social History     Socioeconomic History    Marital status:      Spouse name: None    Number of children: None    Years of education: None    Highest education level: None   Tobacco Use    Smoking status: Never    Smokeless tobacco: Never   Substance and Sexual Activity    Alcohol use: Yes     Comment: occ    Drug use: No    Sexual activity: Yes     Comment: spouse     Social Determinants of Health     Financial Resource Strain: Patient Declined (2024)    Overall Financial Resource Strain (CARDIA)     Difficulty of Paying Living Expenses: Patient declined   Food Insecurity: Patient Declined (2024)    Hunger Vital Sign     Worried About Running Out of Food in the Last Year: Patient declined     Ran Out of Food in the Last Year: Patient declined   Transportation

## 2024-05-14 NOTE — ASSESSMENT & PLAN NOTE
Continues to have drainage, worse the last 3 days.  No signs of bacterial infection today.  Discussed use of Loratadine once a day.  Continue sinus rinses.  Will refer to ENT

## 2024-06-13 ENCOUNTER — OFFICE VISIT (OUTPATIENT)
Dept: ENT CLINIC | Age: 74
End: 2024-06-13
Payer: MEDICARE

## 2024-06-13 VITALS
BODY MASS INDEX: 27.82 KG/M2 | WEIGHT: 167 LBS | DIASTOLIC BLOOD PRESSURE: 80 MMHG | HEIGHT: 65 IN | SYSTOLIC BLOOD PRESSURE: 130 MMHG

## 2024-06-13 DIAGNOSIS — J01.91 ACUTE RECURRENT SINUSITIS, UNSPECIFIED LOCATION: ICD-10-CM

## 2024-06-13 DIAGNOSIS — J34.2 NASAL SEPTAL DEVIATION: ICD-10-CM

## 2024-06-13 DIAGNOSIS — J34.3 NASAL TURBINATE HYPERTROPHY: ICD-10-CM

## 2024-06-13 DIAGNOSIS — J32.9 RHINOSINUSITIS: Primary | ICD-10-CM

## 2024-06-13 PROCEDURE — 1123F ACP DISCUSS/DSCN MKR DOCD: CPT | Performed by: OTOLARYNGOLOGY

## 2024-06-13 PROCEDURE — G8399 PT W/DXA RESULTS DOCUMENT: HCPCS | Performed by: OTOLARYNGOLOGY

## 2024-06-13 PROCEDURE — G8427 DOCREV CUR MEDS BY ELIG CLIN: HCPCS | Performed by: OTOLARYNGOLOGY

## 2024-06-13 PROCEDURE — G8419 CALC BMI OUT NRM PARAM NOF/U: HCPCS | Performed by: OTOLARYNGOLOGY

## 2024-06-13 PROCEDURE — 3079F DIAST BP 80-89 MM HG: CPT | Performed by: OTOLARYNGOLOGY

## 2024-06-13 PROCEDURE — 3075F SYST BP GE 130 - 139MM HG: CPT | Performed by: OTOLARYNGOLOGY

## 2024-06-13 PROCEDURE — 1090F PRES/ABSN URINE INCON ASSESS: CPT | Performed by: OTOLARYNGOLOGY

## 2024-06-13 PROCEDURE — 1036F TOBACCO NON-USER: CPT | Performed by: OTOLARYNGOLOGY

## 2024-06-13 PROCEDURE — 99204 OFFICE O/P NEW MOD 45 MIN: CPT | Performed by: OTOLARYNGOLOGY

## 2024-06-13 PROCEDURE — 31231 NASAL ENDOSCOPY DX: CPT | Performed by: OTOLARYNGOLOGY

## 2024-06-13 PROCEDURE — 3017F COLORECTAL CA SCREEN DOC REV: CPT | Performed by: OTOLARYNGOLOGY

## 2024-06-13 NOTE — PROGRESS NOTES
Sarthak Yadav MD [facility]  [address] [place] [id number]  P: [phone]        6/13/2024    Chief Complaint   Patient presents with    New Patient    Sinus Problem       HPI:  Alvina Lassiter is a pleasant 74 y.o. female originally from [place] seen in consultation today at the request of Dr. Negro for   Chief Complaint   Patient presents with    New Patient    Sinus Problem        Onset of sinus symptoms:life time, worse in 2 years  Recurrent infections: yes  Frequency of sinus infections: 3 infections in 1 year  Number of antibiotics prescribed for sinusitis per year: 3  Longest course of antibiotics taken: 14 days  Currently taking or recent history of nasal steroid use: flonase  Difficulty breathing through the nose and worse time of day: yes  Sinus pressure and pain present and where: forehead, cheeks  Post-nasal drainage present and quality of mucous: sometimes  Cough: sometimes  History of nosebleeds:no  Fatigue present: no  History of allergy testing: no  Previous CT scan performed: no  Previous sinus surgery: no      Current Outpatient Medications:     loratadine (CLARITIN) 10 MG tablet, Take 1 tablet by mouth daily, Disp: 90 tablet, Rfl: 1    rosuvastatin (CRESTOR) 5 MG tablet, Take 1 tablet by mouth daily, Disp: 90 tablet, Rfl: 5    hydroCHLOROthiazide (HYDRODIURIL) 25 MG tablet, Take 1 tablet by mouth every morning, Disp: 90 tablet, Rfl: 5    calcium citrate-vitamin D (CITRACAL+D) 315-200 MG-UNIT per tablet, Take 1 tablet by mouth daily (with breakfast), Disp: , Rfl:     olopatadine (PATADAY) 0.2 % SOLN ophthalmic solution, Apply 1 drop to eye daily, Disp: , Rfl:     Past Medical History:   Diagnosis Date    Hypertension     ((Pt Qnr Sub: n/a))     Map-dot-fingerprint corneal dystrophy     Dr. Kye Foster    Mixed hyperlipidemia 5/8/2013       Past Surgical History:   Procedure Laterality Date    BREAST SURGERY  1/20/03    CYST REMOVAL  7/23/93    RLQ removed at the time of

## 2024-08-19 ENCOUNTER — OFFICE VISIT (OUTPATIENT)
Dept: PRIMARY CARE CLINIC | Facility: CLINIC | Age: 74
End: 2024-08-19
Payer: MEDICARE

## 2024-08-19 ENCOUNTER — TELEPHONE (OUTPATIENT)
Dept: PRIMARY CARE CLINIC | Facility: CLINIC | Age: 74
End: 2024-08-19

## 2024-08-19 VITALS
OXYGEN SATURATION: 99 % | WEIGHT: 166.8 LBS | BODY MASS INDEX: 28.48 KG/M2 | DIASTOLIC BLOOD PRESSURE: 80 MMHG | HEIGHT: 64 IN | SYSTOLIC BLOOD PRESSURE: 132 MMHG | HEART RATE: 63 BPM

## 2024-08-19 DIAGNOSIS — I10 PRIMARY HYPERTENSION: Primary | ICD-10-CM

## 2024-08-19 DIAGNOSIS — E78.2 MIXED HYPERLIPIDEMIA: ICD-10-CM

## 2024-08-19 DIAGNOSIS — J32.0 CHRONIC MAXILLARY SINUSITIS: ICD-10-CM

## 2024-08-19 DIAGNOSIS — M85.852 OSTEOPENIA OF LEFT HIP: ICD-10-CM

## 2024-08-19 PROCEDURE — 1123F ACP DISCUSS/DSCN MKR DOCD: CPT | Performed by: FAMILY MEDICINE

## 2024-08-19 PROCEDURE — G8399 PT W/DXA RESULTS DOCUMENT: HCPCS | Performed by: FAMILY MEDICINE

## 2024-08-19 PROCEDURE — 1036F TOBACCO NON-USER: CPT | Performed by: FAMILY MEDICINE

## 2024-08-19 PROCEDURE — 1090F PRES/ABSN URINE INCON ASSESS: CPT | Performed by: FAMILY MEDICINE

## 2024-08-19 PROCEDURE — 3017F COLORECTAL CA SCREEN DOC REV: CPT | Performed by: FAMILY MEDICINE

## 2024-08-19 PROCEDURE — 3075F SYST BP GE 130 - 139MM HG: CPT | Performed by: FAMILY MEDICINE

## 2024-08-19 PROCEDURE — 99214 OFFICE O/P EST MOD 30 MIN: CPT | Performed by: FAMILY MEDICINE

## 2024-08-19 PROCEDURE — 3079F DIAST BP 80-89 MM HG: CPT | Performed by: FAMILY MEDICINE

## 2024-08-19 PROCEDURE — G8427 DOCREV CUR MEDS BY ELIG CLIN: HCPCS | Performed by: FAMILY MEDICINE

## 2024-08-19 PROCEDURE — G8419 CALC BMI OUT NRM PARAM NOF/U: HCPCS | Performed by: FAMILY MEDICINE

## 2024-08-19 ASSESSMENT — PATIENT HEALTH QUESTIONNAIRE - PHQ9
1. LITTLE INTEREST OR PLEASURE IN DOING THINGS: NOT AT ALL
SUM OF ALL RESPONSES TO PHQ9 QUESTIONS 1 & 2: 0
SUM OF ALL RESPONSES TO PHQ QUESTIONS 1-9: 0
SUM OF ALL RESPONSES TO PHQ QUESTIONS 1-9: 0
2. FEELING DOWN, DEPRESSED OR HOPELESS: NOT AT ALL
SUM OF ALL RESPONSES TO PHQ QUESTIONS 1-9: 0
SUM OF ALL RESPONSES TO PHQ QUESTIONS 1-9: 0

## 2024-08-19 ASSESSMENT — ENCOUNTER SYMPTOMS
ABDOMINAL PAIN: 0
SHORTNESS OF BREATH: 0
DIARRHEA: 0
COUGH: 0
NAUSEA: 0
VOMITING: 0

## 2024-08-19 NOTE — ASSESSMENT & PLAN NOTE
Chronic problem, has seen Dr. Yadav, ENT, who has ordered a CT scan due to lesion found in her left sinuses.  Will follow-up with this result.

## 2024-08-19 NOTE — PATIENT INSTRUCTIONS
IT WAS GREAT TO SEE YOU TODAY!    I WILL CONTACT YOU WITH THE RESULTS OF YOUR LABS.    PLEASE TAKE ALL MEDICATION AS DISCUSSED.  LET ME KNOW IF YOU NEED REFILLS.    I WILL FOLLOW UP WITH DR. DE LA TORRE'S TEST RESULT AND HIS CT SCAN.    I WILL SEE YOU AGAIN IN 6 MONTHS BUT PLEASE CALL WITH CONCERNS [phone]

## 2024-08-19 NOTE — TELEPHONE ENCOUNTER
I called Mrs. Lassiter to schedule a 6 month follow up appointment for her and her . She was not able to schedule it at the time since she was In the car. States she will call back this afternoon or tomorrow to schedule their appointment.

## 2024-08-19 NOTE — PROGRESS NOTES
[facility]  Farideh Negro, DO  [address] [facility], [place] [address]  [phone]         ASSESSMENT AND PLAN    Problem List Items Addressed This Visit          Circulatory    Hypertension - Primary      Chronic, stable on HCTZ.  Will order routine labs for her to get done next week.         Relevant Orders    Lipid Panel    Comprehensive Metabolic Panel    TSH    CBC with Auto Differential    Vitamin D 25 Hydroxy       Respiratory    Chronic sinusitis      Chronic problem, has seen Dr. Yadav, ENT, who has ordered a CT scan due to lesion found in her left sinuses.  Will follow-up with this result.         Relevant Orders    Lipid Panel    Comprehensive Metabolic Panel    TSH    CBC with Auto Differential    Vitamin D 25 Hydroxy       Musculoskeletal and Integument    Osteopenia of left hip      Chronic, will check Vitamin D level.         Relevant Orders    Lipid Panel    Comprehensive Metabolic Panel    TSH    CBC with Auto Differential    Vitamin D 25 Hydroxy       Other    Mixed hyperlipidemia      Chronic, doing well on Rosuvastatin.  Will check labs.         Relevant Orders    Lipid Panel    Comprehensive Metabolic Panel    TSH    CBC with Auto Differential    Vitamin D 25 Hydroxy        The diagnoses and plan were discussed with the patient, who verbalizes understanding and agrees with plan.  All questions answered.    Chief Complaint    Chief Complaint   Patient presents with    Follow-up         HISTORY OF PRESENT ILLNESS    74 y.o. female with HTN presents today for follow up.  Has seen Dr. Yadav, who looked in her sinuses with a camera and saw a lesion in her left maxillary sinus.  Patient notes that he has ordered blood and a CT scan.  Notes that she is constantly stopped up in her nose and has been mildly dizzy.  Notes dizziness comes on intermittently for a few seconds then it passes.  Denies drainage from her ears.  Asks to have her lab orders

## 2024-08-29 ENCOUNTER — OFFICE VISIT (OUTPATIENT)
Age: 74
End: 2024-08-29
Payer: MEDICARE

## 2024-08-29 VITALS
HEIGHT: 64 IN | DIASTOLIC BLOOD PRESSURE: 92 MMHG | WEIGHT: 166 LBS | HEART RATE: 73 BPM | BODY MASS INDEX: 28.34 KG/M2 | SYSTOLIC BLOOD PRESSURE: 132 MMHG

## 2024-08-29 DIAGNOSIS — I10 PRIMARY HYPERTENSION: ICD-10-CM

## 2024-08-29 DIAGNOSIS — R94.31 EKG, ABNORMAL: ICD-10-CM

## 2024-08-29 DIAGNOSIS — I34.0 NONRHEUMATIC MITRAL VALVE REGURGITATION: ICD-10-CM

## 2024-08-29 DIAGNOSIS — E78.2 MIXED HYPERLIPIDEMIA: Primary | ICD-10-CM

## 2024-08-29 DIAGNOSIS — R06.02 SHORTNESS OF BREATH: ICD-10-CM

## 2024-08-29 PROCEDURE — G8399 PT W/DXA RESULTS DOCUMENT: HCPCS | Performed by: INTERNAL MEDICINE

## 2024-08-29 PROCEDURE — 1123F ACP DISCUSS/DSCN MKR DOCD: CPT | Performed by: INTERNAL MEDICINE

## 2024-08-29 PROCEDURE — 3017F COLORECTAL CA SCREEN DOC REV: CPT | Performed by: INTERNAL MEDICINE

## 2024-08-29 PROCEDURE — 3080F DIAST BP >= 90 MM HG: CPT | Performed by: INTERNAL MEDICINE

## 2024-08-29 PROCEDURE — 3075F SYST BP GE 130 - 139MM HG: CPT | Performed by: INTERNAL MEDICINE

## 2024-08-29 PROCEDURE — 93000 ELECTROCARDIOGRAM COMPLETE: CPT | Performed by: INTERNAL MEDICINE

## 2024-08-29 PROCEDURE — 1090F PRES/ABSN URINE INCON ASSESS: CPT | Performed by: INTERNAL MEDICINE

## 2024-08-29 PROCEDURE — G8428 CUR MEDS NOT DOCUMENT: HCPCS | Performed by: INTERNAL MEDICINE

## 2024-08-29 PROCEDURE — 99213 OFFICE O/P EST LOW 20 MIN: CPT | Performed by: INTERNAL MEDICINE

## 2024-08-29 PROCEDURE — G8419 CALC BMI OUT NRM PARAM NOF/U: HCPCS | Performed by: INTERNAL MEDICINE

## 2024-08-29 PROCEDURE — 1036F TOBACCO NON-USER: CPT | Performed by: INTERNAL MEDICINE

## 2024-08-29 NOTE — PROGRESS NOTES
[address] [facility], [address] [place] [address]  PHONE: [phone]     24    NAME:  Alvina Lassiter  : 1950  MRN: [id number]       SUBJECTIVE:   Alvina Lassiter is a 74 y.o. female seen for a follow up visit regarding the following:     Chief Complaint   Patient presents with    Hypertension       HPI:   Here for family hx of CAD.    NST :  LV perfusion is normal.   Echo : normal EF, mod MR     Not able to exercise much with hip issues.  But, no angina.   Less [place], no CP.  Feeling better.   Home -140, better now.    Patient denies recent history of orthopnea, PND, excessive dizziness and/or syncope.        Father  from MI, siblings with CAD.         Past Medical History, Past Surgical History, Family history, Social History, and Medications were all reviewed with the patient today and updated as necessary.     Current Outpatient Medications   Medication Sig Dispense Refill    loratadine (CLARITIN) 10 MG tablet Take 1 tablet by mouth daily (Patient taking differently: Take 1 tablet by mouth as needed) 90 tablet 1    rosuvastatin (CRESTOR) 5 MG tablet Take 1 tablet by mouth daily 90 tablet 5    hydroCHLOROthiazide (HYDRODIURIL) 25 MG tablet Take 1 tablet by mouth every morning 90 tablet 5    calcium citrate-vitamin D (CITRACAL+D) 315-200 MG-UNIT per tablet Take 1 tablet by mouth daily (with breakfast)      olopatadine (PATADAY) 0.2 % SOLN ophthalmic solution Apply 1 drop to eye daily       No current facility-administered medications for this visit.        Allergies   Allergen Reactions    Atorvastatin Other (See Comments)    Erythromycin Nausea And Vomiting     Patient Active Problem List    Diagnosis Date Noted    Injury of nail bed of toe 2022     Priority: Medium    Nonrheumatic mitral valve regurgitation 2022     Priority: Medium    Chronic fatigue 2022     Priority: Medium    Shortness of breath 2022     Priority: Medium    EKG, abnormal  non-tender, non-distended  Ext:   No sig LE edema bilaterally  Skin:  warm and dry, no obvious rashes seen  Neuro:   no obvious sensory or motor deficits  Psychiatric:   normal mood and affect    EKG Today and independently reviewed by me: sinus rhythm, normal intervals and non-specific ST/T wave changes.      Lab Results   Component Value Date/Time     09/26/2023 09:51 AM    K 3.8 09/26/2023 09:51 AM     09/26/2023 09:51 AM    CO2 29 09/26/2023 09:51 AM    BUN 10 09/26/2023 09:51 AM    CREATININE 0.90 09/26/2023 09:51 AM    GLUCOSE 105 09/26/2023 09:51 AM    CALCIUM 9.6 09/26/2023 09:51 AM        Lab Results   Component Value Date    WBC 7.4 09/26/2023    HGB 14.8 09/26/2023    HCT 44.4 09/26/2023    MCV 99.8 09/26/2023     09/26/2023       No results found for: \"TSHFT4\", \"TSH\"    No results found for: \"LABA1C\"  No results found for: \"EAG\"    Lab Results   Component Value Date    CHOL 199 09/26/2023    CHOL 193 11/02/2022    CHOL 179 09/28/2021     Lab Results   Component Value Date    TRIG 77 09/26/2023    TRIG 66 11/02/2022    TRIG 82 09/28/2021     Lab Results   Component Value Date     (H) 09/26/2023    HDL 92 (H) 11/02/2022    HDL 74 09/28/2021     No components found for: \"LDLCHOLESTEROL\", \"LDLCALC\"  Lab Results   Component Value Date    VLDL 15.4 09/26/2023    VLDL 13.2 11/02/2022    VLDL 15 09/28/2021     Lab Results   Component Value Date    CHOLHDLRATIO 2.0 09/26/2023    CHOLHDLRATIO 2.1 11/02/2022     Lab Results   Component Value Date    LDL 82.6 09/26/2023 09/09/22    TRANSTHORACIC ECHOCARDIOGRAM (TTE) COMPLETE (CONTRAST/BUBBLE/3D PRN) 09/12/2022  9:49 AM, 09/12/2022 12:00 AM (Final)    Interpretation Summary    Left Ventricle: Normal left ventricular systolic function with a visually estimated EF of 55 - 60%. Left ventricle size is normal. Normal wall thickness. Normal wall motion. Normal diastolic function.    Left Atrium: Left atrium is mildly dilated. LA Vol

## 2024-09-09 DIAGNOSIS — J01.91 ACUTE RECURRENT SINUSITIS, UNSPECIFIED LOCATION: ICD-10-CM

## 2024-09-14 LAB
S PN DA SERO 19F IGG SER IA-MCNC: 3.4 UG/ML
S PNEUM DA 1 IGG SER IA-MCNC: 0.7 UG/ML
S PNEUM DA 12F IGG SER IA-MCNC: 3.3 UG/ML
S PNEUM DA 14 IGG SER IA-MCNC: 3.8 UG/ML
S PNEUM DA 18C IGG SER IA-MCNC: 4.9 UG/ML
S PNEUM DA 19A IGG SER IA-MCNC: 4.3 UG/ML
S PNEUM DA 23F IGG SER IA-MCNC: 0.2 UG/ML
S PNEUM DA 3 IGG SER IA-MCNC: 0.2 UG/ML
S PNEUM DA 4 IGG SER IA-MCNC: 0.3 UG/ML
S PNEUM DA 6B IGG SER IA-MCNC: 9.1 UG/ML
S PNEUM DA 7F IGG SER IA-MCNC: 2.8 UG/ML
S PNEUM DA 8 IGG SER IA-MCNC: 3.3 UG/ML
S PNEUM DA 9N IGG SER IA-MCNC: 2.1 UG/ML
S PNEUM DA 9V IGG SER IA-MCNC: 0.7 UG/ML

## 2024-09-18 ENCOUNTER — OFFICE VISIT (OUTPATIENT)
Dept: ENT CLINIC | Age: 74
End: 2024-09-18
Payer: MEDICARE

## 2024-09-18 VITALS
WEIGHT: 166 LBS | BODY MASS INDEX: 28.34 KG/M2 | SYSTOLIC BLOOD PRESSURE: 138 MMHG | HEIGHT: 64 IN | DIASTOLIC BLOOD PRESSURE: 88 MMHG

## 2024-09-18 DIAGNOSIS — J34.2 NASAL SEPTAL DEVIATION: ICD-10-CM

## 2024-09-18 DIAGNOSIS — D80.6 ANTI-PNEUMOCOCCAL POLYSACCHARIDE ANTIBODY DEFICIENCY (HCC): Primary | ICD-10-CM

## 2024-09-18 DIAGNOSIS — J34.3 NASAL TURBINATE HYPERTROPHY: ICD-10-CM

## 2024-09-18 DIAGNOSIS — J01.91 ACUTE RECURRENT SINUSITIS, UNSPECIFIED LOCATION: Primary | ICD-10-CM

## 2024-09-18 PROCEDURE — 99213 OFFICE O/P EST LOW 20 MIN: CPT | Performed by: OTOLARYNGOLOGY

## 2024-09-18 PROCEDURE — G8419 CALC BMI OUT NRM PARAM NOF/U: HCPCS | Performed by: OTOLARYNGOLOGY

## 2024-09-18 PROCEDURE — 1123F ACP DISCUSS/DSCN MKR DOCD: CPT | Performed by: OTOLARYNGOLOGY

## 2024-09-18 PROCEDURE — 3017F COLORECTAL CA SCREEN DOC REV: CPT | Performed by: OTOLARYNGOLOGY

## 2024-09-18 PROCEDURE — 1036F TOBACCO NON-USER: CPT | Performed by: OTOLARYNGOLOGY

## 2024-09-18 PROCEDURE — 3079F DIAST BP 80-89 MM HG: CPT | Performed by: OTOLARYNGOLOGY

## 2024-09-18 PROCEDURE — 3075F SYST BP GE 130 - 139MM HG: CPT | Performed by: OTOLARYNGOLOGY

## 2024-09-18 PROCEDURE — G8399 PT W/DXA RESULTS DOCUMENT: HCPCS | Performed by: OTOLARYNGOLOGY

## 2024-09-18 PROCEDURE — 1090F PRES/ABSN URINE INCON ASSESS: CPT | Performed by: OTOLARYNGOLOGY

## 2024-09-18 PROCEDURE — G8427 DOCREV CUR MEDS BY ELIG CLIN: HCPCS | Performed by: OTOLARYNGOLOGY

## 2024-09-18 RX ORDER — PNEUMOCOCCAL VACCINE POLYVALENT 25; 25; 25; 25; 25; 25; 25; 25; 25; 25; 25; 25; 25; 25; 25; 25; 25; 25; 25; 25; 25; 25; 25 UG/.5ML; UG/.5ML; UG/.5ML; UG/.5ML; UG/.5ML; UG/.5ML; UG/.5ML; UG/.5ML; UG/.5ML; UG/.5ML; UG/.5ML; UG/.5ML; UG/.5ML; UG/.5ML; UG/.5ML; UG/.5ML; UG/.5ML; UG/.5ML; UG/.5ML; UG/.5ML; UG/.5ML; UG/.5ML; UG/.5ML
0.5 INJECTION, SOLUTION INTRAMUSCULAR; SUBCUTANEOUS ONCE
Qty: 0.5 ML | Refills: 0 | Status: SHIPPED | OUTPATIENT
Start: 2024-09-18 | End: 2024-09-18

## 2025-02-16 SDOH — ECONOMIC STABILITY: TRANSPORTATION INSECURITY
IN THE PAST 12 MONTHS, HAS THE LACK OF TRANSPORTATION KEPT YOU FROM MEDICAL APPOINTMENTS OR FROM GETTING MEDICATIONS?: NO

## 2025-02-16 SDOH — ECONOMIC STABILITY: FOOD INSECURITY: WITHIN THE PAST 12 MONTHS, YOU WORRIED THAT YOUR FOOD WOULD RUN OUT BEFORE YOU GOT MONEY TO BUY MORE.: NEVER TRUE

## 2025-02-16 SDOH — ECONOMIC STABILITY: FOOD INSECURITY: WITHIN THE PAST 12 MONTHS, THE FOOD YOU BOUGHT JUST DIDN'T LAST AND YOU DIDN'T HAVE MONEY TO GET MORE.: NEVER TRUE

## 2025-02-16 SDOH — ECONOMIC STABILITY: INCOME INSECURITY: IN THE LAST 12 MONTHS, WAS THERE A TIME WHEN YOU WERE NOT ABLE TO PAY THE MORTGAGE OR RENT ON TIME?: NO

## 2025-02-16 SDOH — ECONOMIC STABILITY: TRANSPORTATION INSECURITY
IN THE PAST 12 MONTHS, HAS LACK OF TRANSPORTATION KEPT YOU FROM MEETINGS, WORK, OR FROM GETTING THINGS NEEDED FOR DAILY LIVING?: NO

## 2025-02-16 ASSESSMENT — PATIENT HEALTH QUESTIONNAIRE - PHQ9
2. FEELING DOWN, DEPRESSED OR HOPELESS: NOT AT ALL
SUM OF ALL RESPONSES TO PHQ QUESTIONS 1-9: 0
SUM OF ALL RESPONSES TO PHQ QUESTIONS 1-9: 0
1. LITTLE INTEREST OR PLEASURE IN DOING THINGS: NOT AT ALL
SUM OF ALL RESPONSES TO PHQ9 QUESTIONS 1 & 2: 0
SUM OF ALL RESPONSES TO PHQ QUESTIONS 1-9: 0
SUM OF ALL RESPONSES TO PHQ9 QUESTIONS 1 & 2: 0
1. LITTLE INTEREST OR PLEASURE IN DOING THINGS: NOT AT ALL
SUM OF ALL RESPONSES TO PHQ QUESTIONS 1-9: 0
2. FEELING DOWN, DEPRESSED OR HOPELESS: NOT AT ALL

## 2025-02-19 ENCOUNTER — OFFICE VISIT (OUTPATIENT)
Dept: PRIMARY CARE CLINIC | Facility: CLINIC | Age: 75
End: 2025-02-19
Payer: MEDICARE

## 2025-02-19 VITALS
DIASTOLIC BLOOD PRESSURE: 86 MMHG | HEIGHT: 64 IN | TEMPERATURE: 97.5 F | WEIGHT: 167 LBS | SYSTOLIC BLOOD PRESSURE: 122 MMHG | BODY MASS INDEX: 28.51 KG/M2 | OXYGEN SATURATION: 95 % | HEART RATE: 78 BPM

## 2025-02-19 DIAGNOSIS — R76.0 ABNORMAL ANTIBODY TITER: Primary | ICD-10-CM

## 2025-02-19 PROCEDURE — 3017F COLORECTAL CA SCREEN DOC REV: CPT | Performed by: FAMILY MEDICINE

## 2025-02-19 PROCEDURE — 1036F TOBACCO NON-USER: CPT | Performed by: FAMILY MEDICINE

## 2025-02-19 PROCEDURE — 1159F MED LIST DOCD IN RCRD: CPT | Performed by: FAMILY MEDICINE

## 2025-02-19 PROCEDURE — 1090F PRES/ABSN URINE INCON ASSESS: CPT | Performed by: FAMILY MEDICINE

## 2025-02-19 PROCEDURE — G8419 CALC BMI OUT NRM PARAM NOF/U: HCPCS | Performed by: FAMILY MEDICINE

## 2025-02-19 PROCEDURE — 1123F ACP DISCUSS/DSCN MKR DOCD: CPT | Performed by: FAMILY MEDICINE

## 2025-02-19 PROCEDURE — 99214 OFFICE O/P EST MOD 30 MIN: CPT | Performed by: FAMILY MEDICINE

## 2025-02-19 PROCEDURE — G8427 DOCREV CUR MEDS BY ELIG CLIN: HCPCS | Performed by: FAMILY MEDICINE

## 2025-02-19 PROCEDURE — 3074F SYST BP LT 130 MM HG: CPT | Performed by: FAMILY MEDICINE

## 2025-02-19 PROCEDURE — 3079F DIAST BP 80-89 MM HG: CPT | Performed by: FAMILY MEDICINE

## 2025-02-19 PROCEDURE — G8399 PT W/DXA RESULTS DOCUMENT: HCPCS | Performed by: FAMILY MEDICINE

## 2025-02-19 ASSESSMENT — ENCOUNTER SYMPTOMS
VOMITING: 0
NAUSEA: 0
DIARRHEA: 0
COUGH: 0
SHORTNESS OF BREATH: 0
ABDOMINAL PAIN: 0

## 2025-02-19 NOTE — PATIENT INSTRUCTIONS
IT WAS GREAT TO SEE YOU TODAY!    I WILL CONTACT YOU WITH THE RESULTS OF YOUR LABS.    PLEASE TAKE ALL MEDICATION AS DISCUSSED.  LET ME KNOW IF YOU NEED REFILLS.    I WILL SEE YOU AGAIN IN 3 WEEKS/MONTHS BUT PLEASE CALL WITH CONCERNS [phone]

## 2025-02-19 NOTE — PROGRESS NOTES
[facility]  Farideh Sagastume Marky, DO  [address] [facility], [address]  [place] [id number] [phone]         ASSESSMENT AND PLAN    Problem List Items Addressed This Visit          Other    Abnormal antibody titer - Primary    Relevant Orders    Pneumococcal Immunity 14 type        The diagnoses and plan were discussed with the patient, who verbalizes understanding and agrees with plan.  All questions answered.    Chief Complaint    Chief Complaint   Patient presents with    6 Month Follow-Up         HISTORY OF PRESENT ILLNESS    74 y.o. female with HTN presents today for follow up.  Last seen 6 months ago, had labs done and was doing well on HCTZ for her blood pressure.  Was seeing ENT for chronic sinusitis.  States that he checked blood work and said that she was not immune to the pneumococcal viruses.  She went to the pharmacy and got her pneumococcal shot and is asking to repeat her immunity test to make sure that she is doing well.  No other concerns today.    PAST MEDICAL HISTORY    Past Medical History:   Diagnosis Date    Hypertension     ((Pt Qnr Sub: n/a))     Map-dot-fingerprint corneal dystrophy     Dr. Kye Foster    Mixed hyperlipidemia 2013       PAST SURGICAL HISTORY    Past Surgical History:   Procedure Laterality Date    BREAST SURGERY  03    CYST REMOVAL  93    RLQ removed at the time of the hernia repair    HERNIA REPAIR  93    rt side    HYSTEROSCOPY, STERILIZE W IMPLANTS  99    KNEE ARTHROSCOPY  1986    rt knee    OTHER SURGICAL HISTORY  98,99    stromal pucture rt eye for Map Dot Finger print dystrophy    TONSILLECTOMY         FAMILY HISTORY    Family History   Problem Relation Age of Onset    Heart Attack Father         age 35     Cancer Mother         breast  later in life    High Cholesterol Sister     High Cholesterol Brother        SOCIAL HISTORY    Social History     Socioeconomic History    Marital status:

## 2025-03-19 ENCOUNTER — RESULTS FOLLOW-UP (OUTPATIENT)
Dept: PRIMARY CARE CLINIC | Facility: CLINIC | Age: 75
End: 2025-03-19

## 2025-03-19 DIAGNOSIS — M85.852 OSTEOPENIA OF LEFT HIP: ICD-10-CM

## 2025-03-19 DIAGNOSIS — J32.0 CHRONIC MAXILLARY SINUSITIS: ICD-10-CM

## 2025-03-19 DIAGNOSIS — R76.0 ABNORMAL ANTIBODY TITER: ICD-10-CM

## 2025-03-19 DIAGNOSIS — I10 PRIMARY HYPERTENSION: ICD-10-CM

## 2025-03-19 DIAGNOSIS — E78.2 MIXED HYPERLIPIDEMIA: ICD-10-CM

## 2025-03-19 LAB
25(OH)D3 SERPL-MCNC: 45.3 NG/ML (ref 30–100)
ALBUMIN SERPL-MCNC: 3.9 G/DL (ref 3.2–4.6)
ALBUMIN/GLOB SERPL: 1.4 (ref 1–1.9)
ALP SERPL-CCNC: 56 U/L (ref 35–104)
ALT SERPL-CCNC: 18 U/L (ref 8–45)
ANION GAP SERPL CALC-SCNC: 11 MMOL/L (ref 7–16)
AST SERPL-CCNC: 22 U/L (ref 15–37)
BASOPHILS # BLD: 0.05 K/UL (ref 0–0.2)
BASOPHILS NFR BLD: 0.8 % (ref 0–2)
BILIRUB SERPL-MCNC: 0.6 MG/DL (ref 0–1.2)
BUN SERPL-MCNC: 8 MG/DL (ref 8–23)
CALCIUM SERPL-MCNC: 9.6 MG/DL (ref 8.8–10.2)
CHLORIDE SERPL-SCNC: 100 MMOL/L (ref 98–107)
CHOLEST SERPL-MCNC: 191 MG/DL (ref 0–200)
CO2 SERPL-SCNC: 28 MMOL/L (ref 20–29)
CREAT SERPL-MCNC: 0.78 MG/DL (ref 0.6–1.1)
DIFFERENTIAL METHOD BLD: NORMAL
EOSINOPHIL # BLD: 0.1 K/UL (ref 0–0.8)
EOSINOPHIL NFR BLD: 1.6 % (ref 0.5–7.8)
ERYTHROCYTE [DISTWIDTH] IN BLOOD BY AUTOMATED COUNT: 13 % (ref 11.9–14.6)
GLOBULIN SER CALC-MCNC: 2.7 G/DL (ref 2.3–3.5)
GLUCOSE SERPL-MCNC: 97 MG/DL (ref 70–99)
HCT VFR BLD AUTO: 45.7 % (ref 35.8–46.3)
HDLC SERPL-MCNC: 90 MG/DL (ref 40–60)
HDLC SERPL: 2.1 (ref 0–5)
HGB BLD-MCNC: 15.1 G/DL (ref 11.7–15.4)
IMM GRANULOCYTES # BLD AUTO: 0.01 K/UL (ref 0–0.5)
IMM GRANULOCYTES NFR BLD AUTO: 0.2 % (ref 0–5)
LDLC SERPL CALC-MCNC: 86 MG/DL (ref 0–100)
LYMPHOCYTES # BLD: 2.08 K/UL (ref 0.5–4.6)
LYMPHOCYTES NFR BLD: 33.1 % (ref 13–44)
MCH RBC QN AUTO: 32.7 PG (ref 26.1–32.9)
MCHC RBC AUTO-ENTMCNC: 33 G/DL (ref 31.4–35)
MCV RBC AUTO: 98.9 FL (ref 82–102)
MONOCYTES # BLD: 0.49 K/UL (ref 0.1–1.3)
MONOCYTES NFR BLD: 7.8 % (ref 4–12)
NEUTS SEG # BLD: 3.56 K/UL (ref 1.7–8.2)
NEUTS SEG NFR BLD: 56.5 % (ref 43–78)
NRBC # BLD: 0 K/UL (ref 0–0.2)
PLATELET # BLD AUTO: 242 K/UL (ref 150–450)
PMV BLD AUTO: 9.9 FL (ref 9.4–12.3)
POTASSIUM SERPL-SCNC: 3.4 MMOL/L (ref 3.5–5.1)
PROT SERPL-MCNC: 6.6 G/DL (ref 6.3–8.2)
RBC # BLD AUTO: 4.62 M/UL (ref 4.05–5.2)
SODIUM SERPL-SCNC: 140 MMOL/L (ref 136–145)
TRIGL SERPL-MCNC: 74 MG/DL (ref 0–150)
TSH, 3RD GENERATION: 1.15 UIU/ML (ref 0.27–4.2)
VLDLC SERPL CALC-MCNC: 15 MG/DL (ref 6–23)
WBC # BLD AUTO: 6.3 K/UL (ref 4.3–11.1)

## 2025-03-27 LAB
S PN DA SERO 19F IGG SER IA-MCNC: 3.3 UG/ML
S PNEUM DA 1 IGG SER IA-MCNC: 1.4 UG/ML
S PNEUM DA 12F IGG SER IA-MCNC: 2.3 UG/ML
S PNEUM DA 14 IGG SER IA-MCNC: 5.3 UG/ML
S PNEUM DA 18C IGG SER IA-MCNC: 6.5 UG/ML
S PNEUM DA 19A IGG SER IA-MCNC: 3.5 UG/ML
S PNEUM DA 23F IGG SER IA-MCNC: 0.5 UG/ML
S PNEUM DA 3 IGG SER IA-MCNC: 0.4 UG/ML
S PNEUM DA 4 IGG SER IA-MCNC: 1.6 UG/ML
S PNEUM DA 6B IGG SER IA-MCNC: 8.6 UG/ML
S PNEUM DA 7F IGG SER IA-MCNC: 5.8 UG/ML
S PNEUM DA 8 IGG SER IA-MCNC: 12.7 UG/ML
S PNEUM DA 9N IGG SER IA-MCNC: 2.2 UG/ML
S PNEUM DA 9V IGG SER IA-MCNC: 1.5 UG/ML

## 2025-04-17 ENCOUNTER — OFFICE VISIT (OUTPATIENT)
Age: 75
End: 2025-04-17
Payer: MEDICARE

## 2025-04-17 DIAGNOSIS — M70.62 GREATER TROCHANTERIC BURSITIS OF BOTH HIPS: Primary | ICD-10-CM

## 2025-04-17 DIAGNOSIS — M70.61 GREATER TROCHANTERIC BURSITIS OF BOTH HIPS: Primary | ICD-10-CM

## 2025-04-17 PROCEDURE — G8428 CUR MEDS NOT DOCUMENT: HCPCS | Performed by: ANESTHESIOLOGY

## 2025-04-17 PROCEDURE — 1090F PRES/ABSN URINE INCON ASSESS: CPT | Performed by: ANESTHESIOLOGY

## 2025-04-17 PROCEDURE — G8399 PT W/DXA RESULTS DOCUMENT: HCPCS | Performed by: ANESTHESIOLOGY

## 2025-04-17 PROCEDURE — 99204 OFFICE O/P NEW MOD 45 MIN: CPT | Performed by: ANESTHESIOLOGY

## 2025-04-17 PROCEDURE — 3017F COLORECTAL CA SCREEN DOC REV: CPT | Performed by: ANESTHESIOLOGY

## 2025-04-17 PROCEDURE — 1123F ACP DISCUSS/DSCN MKR DOCD: CPT | Performed by: ANESTHESIOLOGY

## 2025-04-17 PROCEDURE — 1036F TOBACCO NON-USER: CPT | Performed by: ANESTHESIOLOGY

## 2025-04-17 PROCEDURE — G8419 CALC BMI OUT NRM PARAM NOF/U: HCPCS | Performed by: ANESTHESIOLOGY

## 2025-04-17 NOTE — PROGRESS NOTES
Chronic Pain Consult Note      Plan:     A comprehensive pain management plan may consist of the following: Testing, Therapy, Medications, Interventions, Consults, and Follow up.    Greater trochanteric bursitis, bilateral  Scheduled bilateral GTB injections  Chronic pain syndrome  Encouraged continuation of active healthy lifestyle    General Recommendations: The pain condition that the patient suffers from is best treated with a multidisciplinary approach that involves an increase in physical activity to prevent de-conditioning and worsening of the pain cycle, as well as psychological counseling (formal and/or informal) to address the co morbid psychological effects of pain. Treatment will often involve judicious use of pain medications and interventional procedures to decrease the pain, allowing the patient to participate in the physical activity that will ultimately produce long-lasting pain reductions. The goal of the multidisciplinary approach is to return the patient to a higher level of overall function and to restore their ability to perform activities of daily living.      Referring Provider: No ref. provider found  Assessment:      Chief Complaint: New Patient      Alvina Lassiter is a 74 y.o. female [facility] for the following diagnoses:    Diagnosis:  No diagnosis found.      Subjective:      HPI:  Ms. Lassiter is seen in consultation at the request of No ref. provider found for evaluation and recommendations regarding the above diagnoses and the below HPI.    HPI on04/17/25: 74-year-old female presents for evaluation treatment of bilateral hip pain.  Patient reports pains been present for well beyond 6 months.  Patient has pain without inciting event.  Patient's pain is bilateral lateral aspect of both hips.  At times pain can descend into the mid lateral femoral shaft.  Patient's pain is worse with standing operating functioning performing daily tasks and activities.

## 2025-05-02 NOTE — PROGRESS NOTES
Procedure Date: May 2, 2025      Location: [facility]       Procedure: BILATERAL BURSA       Time Out performed prior to start of the procedure:       Earnest Patterson MD performed the following reviews on Alvina RADHA [facility] 1950 prior to the start of the procedure:       patient was identified by name and     agreement on procedure being performed was verified   risks and benefits explained to patient by the provider  procedure site verified as Bilateral  patient was positioned for comfort   consent signed and verified for procedure       Time:  9:21 AM        Procedure performed by:   Earnest Patterson MD      Patient assisted by:   SILVESTRE COURTNEY

## 2025-05-07 ENCOUNTER — OFFICE VISIT (OUTPATIENT)
Age: 75
End: 2025-05-07

## 2025-05-07 DIAGNOSIS — M70.61 GREATER TROCHANTERIC BURSITIS OF BOTH HIPS: Primary | ICD-10-CM

## 2025-05-07 DIAGNOSIS — M70.62 GREATER TROCHANTERIC BURSITIS OF BOTH HIPS: Primary | ICD-10-CM

## 2025-05-07 RX ORDER — METHYLPREDNISOLONE ACETATE 40 MG/ML
40 INJECTION, SUSPENSION INTRA-ARTICULAR; INTRALESIONAL; INTRAMUSCULAR; SOFT TISSUE ONCE
Status: COMPLETED | OUTPATIENT
Start: 2025-05-07 | End: 2025-05-07

## 2025-05-07 NOTE — PROGRESS NOTES
DAGTBINJ   NAME: Alvina GARCIA [facility]   ID:[id number]   :1950    Location: [facility] pain management    Preprocedure Diagnosis: Bilateral greater Trochanteric Bursitis     Postprocedure Diagnosis: Greater Trochanteric Bursitis     Procedure: Greater Trochanteric Bursa Injection    Anesthesia: None    Complications: None    After confirming written and informed consent and discussing the risk, benefits and alternatives for the procedure, the patient had the correct site marked by the physician performing the procedure. The specific risks of bleeding and infection were discussed.    The patient was placed in the supine position. The physician cleaned his hands with an alcohol containing solution, and wore a hat and mask. Sterile gloves were applied. The skin overlying the lateral left and right hip was cleaned with CHG. Sterile towels were applied as a drape. A timeout was then performed involving the patient, physician    The greater trochanter was then identified using palpation. The skin overlying the anticipated entry site and the expected needle path was anesthetized with 3 ml of 1% lidocaine using a 25 G 1.5 inch needle. Next, a 22 G  1.5 inch needle was advanced superior to the greater trochanter. The needle tip was advanced to the superior aspect of the greater trochanter until bone was contacted. Aspiration was negative for blood or joint fluid. Depo-Medrol 40 mg was injected.    This procedure was completed on the contralateral side using the same medication technique as discussed above.  The needle was then removed. A Band-Aid was applied. The patient was monitored in the recovery room for an adequate amount of time. Upon meeting discharge criteria, the patient was discharged from the clinic.

## 2025-05-12 ENCOUNTER — TELEPHONE (OUTPATIENT)
Age: 75
End: 2025-05-12

## 2025-05-12 NOTE — TELEPHONE ENCOUNTER
Patient called stating she had bilateral bursa injection last Wednesday with xray guidance.  She is in a lot of pain and it hurts to walk.  She states that she had the same injections with a different doctor before and it was done under ultrasound.  She states she did not have this much pain following the ultrasound guided injection.  I informed her that the steroid does take a few weeks to give relief. She wanted me to check with you anyway on what she can do for the pain.

## 2025-05-14 ENCOUNTER — PATIENT MESSAGE (OUTPATIENT)
Age: 75
End: 2025-05-14

## 2025-05-15 RX ORDER — DICLOFENAC SODIUM 75 MG/1
75 TABLET, DELAYED RELEASE ORAL 2 TIMES DAILY
Qty: 30 TABLET | Refills: 0 | Status: SHIPPED | OUTPATIENT
Start: 2025-05-15

## 2025-05-29 ENCOUNTER — OFFICE VISIT (OUTPATIENT)
Dept: PRIMARY CARE CLINIC | Facility: CLINIC | Age: 75
End: 2025-05-29
Payer: MEDICARE

## 2025-05-29 VITALS
DIASTOLIC BLOOD PRESSURE: 88 MMHG | HEART RATE: 77 BPM | SYSTOLIC BLOOD PRESSURE: 138 MMHG | OXYGEN SATURATION: 98 % | BODY MASS INDEX: 28.51 KG/M2 | TEMPERATURE: 98.2 F | WEIGHT: 167 LBS | HEIGHT: 64 IN

## 2025-05-29 DIAGNOSIS — M70.62 GREATER TROCHANTERIC BURSITIS, LEFT: ICD-10-CM

## 2025-05-29 DIAGNOSIS — B37.9 CANDIDIASIS: ICD-10-CM

## 2025-05-29 DIAGNOSIS — I10 PRIMARY HYPERTENSION: ICD-10-CM

## 2025-05-29 DIAGNOSIS — E78.2 MIXED HYPERLIPIDEMIA: ICD-10-CM

## 2025-05-29 DIAGNOSIS — Z00.00 MEDICARE ANNUAL WELLNESS VISIT, SUBSEQUENT: Primary | ICD-10-CM

## 2025-05-29 PROCEDURE — 1090F PRES/ABSN URINE INCON ASSESS: CPT | Performed by: FAMILY MEDICINE

## 2025-05-29 PROCEDURE — 3017F COLORECTAL CA SCREEN DOC REV: CPT | Performed by: FAMILY MEDICINE

## 2025-05-29 PROCEDURE — 1160F RVW MEDS BY RX/DR IN RCRD: CPT | Performed by: FAMILY MEDICINE

## 2025-05-29 PROCEDURE — 1036F TOBACCO NON-USER: CPT | Performed by: FAMILY MEDICINE

## 2025-05-29 PROCEDURE — 3075F SYST BP GE 130 - 139MM HG: CPT | Performed by: FAMILY MEDICINE

## 2025-05-29 PROCEDURE — 3079F DIAST BP 80-89 MM HG: CPT | Performed by: FAMILY MEDICINE

## 2025-05-29 PROCEDURE — G8419 CALC BMI OUT NRM PARAM NOF/U: HCPCS | Performed by: FAMILY MEDICINE

## 2025-05-29 PROCEDURE — G8399 PT W/DXA RESULTS DOCUMENT: HCPCS | Performed by: FAMILY MEDICINE

## 2025-05-29 PROCEDURE — 1123F ACP DISCUSS/DSCN MKR DOCD: CPT | Performed by: FAMILY MEDICINE

## 2025-05-29 PROCEDURE — G8427 DOCREV CUR MEDS BY ELIG CLIN: HCPCS | Performed by: FAMILY MEDICINE

## 2025-05-29 PROCEDURE — G0439 PPPS, SUBSEQ VISIT: HCPCS | Performed by: FAMILY MEDICINE

## 2025-05-29 PROCEDURE — 99214 OFFICE O/P EST MOD 30 MIN: CPT | Performed by: FAMILY MEDICINE

## 2025-05-29 PROCEDURE — 1159F MED LIST DOCD IN RCRD: CPT | Performed by: FAMILY MEDICINE

## 2025-05-29 PROCEDURE — 1126F AMNT PAIN NOTED NONE PRSNT: CPT | Performed by: FAMILY MEDICINE

## 2025-05-29 RX ORDER — FLUCONAZOLE 150 MG/1
150 TABLET ORAL
Qty: 2 TABLET | Refills: 0 | Status: SHIPPED | OUTPATIENT
Start: 2025-05-29 | End: 2025-06-04

## 2025-05-29 RX ORDER — ROSUVASTATIN CALCIUM 5 MG/1
5 TABLET, COATED ORAL DAILY
Qty: 90 TABLET | Refills: 2 | Status: SHIPPED | OUTPATIENT
Start: 2025-05-29

## 2025-05-29 RX ORDER — HYDROCHLOROTHIAZIDE 25 MG/1
25 TABLET ORAL EVERY MORNING
Qty: 90 TABLET | Refills: 2 | Status: SHIPPED | OUTPATIENT
Start: 2025-05-29

## 2025-05-29 ASSESSMENT — LIFESTYLE VARIABLES
HOW MANY STANDARD DRINKS CONTAINING ALCOHOL DO YOU HAVE ON A TYPICAL DAY: 1 OR 2
HOW OFTEN DURING THE LAST YEAR HAVE YOU BEEN UNABLE TO REMEMBER WHAT HAPPENED THE NIGHT BEFORE BECAUSE YOU HAD BEEN DRINKING: NEVER
HOW OFTEN DO YOU HAVE A DRINK CONTAINING ALCOHOL: 4 OR MORE TIMES A WEEK
HOW OFTEN DURING THE LAST YEAR HAVE YOU NEEDED AN ALCOHOLIC DRINK FIRST THING IN THE MORNING TO GET YOURSELF GOING AFTER A NIGHT OF HEAVY DRINKING: NEVER
HAS A RELATIVE, FRIEND, DOCTOR, OR ANOTHER HEALTH PROFESSIONAL EXPRESSED CONCERN ABOUT YOUR DRINKING OR SUGGESTED YOU CUT DOWN: NO
HAVE YOU OR SOMEONE ELSE BEEN INJURED AS A RESULT OF YOUR DRINKING: NO
HOW OFTEN DURING THE LAST YEAR HAVE YOU HAD A FEELING OF GUILT OR REMORSE AFTER DRINKING: NEVER
HOW OFTEN DURING THE LAST YEAR HAVE YOU FOUND THAT YOU WERE NOT ABLE TO STOP DRINKING ONCE YOU HAD STARTED: NEVER
HOW OFTEN DURING THE LAST YEAR HAVE YOU FAILED TO DO WHAT WAS NORMALLY EXPECTED FROM YOU BECAUSE OF DRINKING: NEVER

## 2025-05-29 ASSESSMENT — PATIENT HEALTH QUESTIONNAIRE - PHQ9
2. FEELING DOWN, DEPRESSED OR HOPELESS: NOT AT ALL
SUM OF ALL RESPONSES TO PHQ QUESTIONS 1-9: 0
1. LITTLE INTEREST OR PLEASURE IN DOING THINGS: NOT AT ALL
SUM OF ALL RESPONSES TO PHQ QUESTIONS 1-9: 0

## 2025-05-29 ASSESSMENT — ENCOUNTER SYMPTOMS
SHORTNESS OF BREATH: 0
DIARRHEA: 0
VOMITING: 0
COUGH: 0
ABDOMINAL PAIN: 0
NAUSEA: 0

## 2025-05-29 NOTE — ASSESSMENT & PLAN NOTE
Acute, unmanaged.  Patient with sore skin and vaginal itching with scant discharge.  Discussed continued use of her Vagisil, will add Diflucan.  Advised to hold the Hydrocortisone cream.

## 2025-05-29 NOTE — ASSESSMENT & PLAN NOTE
Chronic, not improved after recent injection from a new pain management physician.  Taking Advil which is helping.  Has follow up in two weeks with them.

## 2025-05-29 NOTE — PROGRESS NOTES
Medicare Annual Wellness Visit    Alvina Lassiter is here for Medicare AWV, 3 Month Follow-Up, and Vaginitis (Yeast, and dryness and it hurts )    Assessment & Plan   Medicare annual wellness visit, subsequent  Primary hypertension  Assessment & Plan:  Chronic, stable on HCTZ.  Will send refill.  Mixed hyperlipidemia  Assessment & Plan:  Chronic, stable on Rosuvastatin.  Will send refill.  Candidiasis  Assessment & Plan:   Acute, unmanaged.  Patient with sore skin and vaginal itching with scant discharge.  Discussed continued use of her Vagisil, will add Diflucan.  Advised to hold the Hydrocortisone cream.  Greater trochanteric bursitis, left  Assessment & Plan:  Chronic, not improved after recent injection from a new pain management physician.  Taking Advil which is helping.  Has follow up in two weeks with them.     Return in 6 months (on 11/29/2025).     Subjective     73 yo female presents for Medicare AWV and follow up.  Last seen three months ago, had a test for pneumococcal immunity as recommended by ENT.  Notes that she had an injection by Dr. Patterson for bursitis and states that they did it with an X-ray.  States that she had a lot of pain for the next couple of days and notes that it has not helped much.  Is taking an Advil about 3 times per day.  Is also doing some stretching exercises.  States that she thinks she may have a yeast infection.  States that she has some trace white discharge.  Has not noticed an odor.  Notes an odor Notes that she has been trying Cortisone cream on this.  Has also tried Vagisil.      Patient's complete Health Risk Assessment and screening values have been reviewed and are found in Flowsheets. The following problems were reviewed today and where indicated follow up appointments were made and/or referrals ordered.    No Positive Risk Factors identified today.       Review of Systems   Constitutional:  Negative for fever.   HENT:  Negative for congestion.    Respiratory:

## 2025-06-09 ENCOUNTER — OFFICE VISIT (OUTPATIENT)
Age: 75
End: 2025-06-09
Payer: MEDICARE

## 2025-06-09 DIAGNOSIS — M25.552 BILATERAL HIP PAIN: Primary | ICD-10-CM

## 2025-06-09 DIAGNOSIS — M25.551 BILATERAL HIP PAIN: Primary | ICD-10-CM

## 2025-06-09 DIAGNOSIS — G89.29 CHRONIC BILATERAL LOW BACK PAIN WITHOUT SCIATICA: ICD-10-CM

## 2025-06-09 DIAGNOSIS — M54.50 CHRONIC BILATERAL LOW BACK PAIN WITHOUT SCIATICA: ICD-10-CM

## 2025-06-09 DIAGNOSIS — M46.1 BILATERAL SACROILIITIS: ICD-10-CM

## 2025-06-09 PROCEDURE — G8428 CUR MEDS NOT DOCUMENT: HCPCS | Performed by: ANESTHESIOLOGY

## 2025-06-09 PROCEDURE — 3017F COLORECTAL CA SCREEN DOC REV: CPT | Performed by: ANESTHESIOLOGY

## 2025-06-09 PROCEDURE — 1123F ACP DISCUSS/DSCN MKR DOCD: CPT | Performed by: ANESTHESIOLOGY

## 2025-06-09 PROCEDURE — 1090F PRES/ABSN URINE INCON ASSESS: CPT | Performed by: ANESTHESIOLOGY

## 2025-06-09 PROCEDURE — 1036F TOBACCO NON-USER: CPT | Performed by: ANESTHESIOLOGY

## 2025-06-09 PROCEDURE — G8399 PT W/DXA RESULTS DOCUMENT: HCPCS | Performed by: ANESTHESIOLOGY

## 2025-06-09 PROCEDURE — G8419 CALC BMI OUT NRM PARAM NOF/U: HCPCS | Performed by: ANESTHESIOLOGY

## 2025-06-09 PROCEDURE — 99213 OFFICE O/P EST LOW 20 MIN: CPT | Performed by: ANESTHESIOLOGY

## 2025-06-09 NOTE — PROGRESS NOTES
tablet by mouth daily 90 tablet 2    diclofenac (VOLTAREN) 75 MG EC tablet Take 1 tablet by mouth 2 times daily 30 tablet 0    loratadine (CLARITIN) 10 MG tablet Take 1 tablet by mouth daily (Patient taking differently: Take 1 tablet by mouth as needed) 90 tablet 1    calcium citrate-vitamin D (CITRACAL+D) 315-200 MG-UNIT per tablet Take 1 tablet by mouth daily (with breakfast)      olopatadine (PATADAY) 0.2 % SOLN ophthalmic solution Apply 1 drop to eye daily       No facility-administered encounter medications on file as of 6/9/2025.          Review of Systems      All 11 systems reviewed and were negative.          The [place] database for controlled substance prescription monitoring was reviewed.    Date: June 9, 2025  Patient Name: Alvina Lassiter  MRN:[id number]  PCP: Farideh Negro    I personally performed the HPI, exam, and assessment/plan, verified the documentation and approve it is updated, accurate, and complete. Parts or the entirety of this document were transcribed utilizing voice recognition software. Transcription errors may be present.    Earnest Patterson M.D.

## 2025-06-27 NOTE — PROGRESS NOTES
Location: [facility]       Procedure: BILATERAL SI JOINT       Time Out performed prior to start of the procedure:       Earnest Patterson MD performed the following reviews on Alvina RADHA [facility] 1950 prior to the start of the procedure:       patient was identified by name and     agreement on procedure being performed was verified   risks and benefits explained to patient by the provider  procedure site verified as Bilateral  patient was positioned for comfort   consent signed and verified for procedure             Procedure performed by:   Earnest Patterson MD      Patient assisted by:   SILVESTRE COURTNEY    Patient/Caregiver provided printed discharge information.

## 2025-06-28 PROBLEM — Z00.00 MEDICARE ANNUAL WELLNESS VISIT, SUBSEQUENT: Status: RESOLVED | Noted: 2017-07-20 | Resolved: 2025-06-28

## 2025-06-30 ENCOUNTER — OFFICE VISIT (OUTPATIENT)
Age: 75
End: 2025-06-30
Payer: MEDICARE

## 2025-06-30 DIAGNOSIS — M46.1 BILATERAL SACROILIITIS: Primary | ICD-10-CM

## 2025-06-30 PROCEDURE — 27096 INJECT SACROILIAC JOINT: CPT | Performed by: ANESTHESIOLOGY

## 2025-06-30 RX ORDER — METHYLPREDNISOLONE ACETATE 40 MG/ML
40 INJECTION, SUSPENSION INTRA-ARTICULAR; INTRALESIONAL; INTRAMUSCULAR; SOFT TISSUE ONCE
Status: COMPLETED | OUTPATIENT
Start: 2025-06-30 | End: 2025-06-30

## 2025-06-30 NOTE — PROGRESS NOTES
NAME: Alvina GARCIA [facility]   ID:[id number]   :1950    Location: [id number]    Procedure: bilateral Sacroiliac Joint Injection Under Fluoroscopic Imaging     Pre-op Diagnosis: Sacroillitis    Post-op Diagnosis: Same     Anesthesia: Local only    Complications: None    After confirming written and informed consent and discussing the risk, benefits and alternatives for the procedure, the patient had the correct site marked by the physician performing the procedure. The specific risks of bleeding and infection were discussed. The patient was taken to the fluoroscopy suite. A pulse oximeter was placed, and verbal and visual monitoring were maintained throughout the procedure.    The patient was then placed in the prone position. The skin overlying the lumbo-sacral spine and the sacroiliac joint was then prepped with chlorhexidine gluconate and a sterile drape was placed. Appropriate sterile attire was worn, including sterile gloves. A time out was then performed involving the physician, radiation technologist and the patient.    Next, the anatomy of the sacroiliac joint was then identified using fluoroscopic guidance. The appropriate sacroiliac joint(s) was identified using andreas-posterior fluoroscopy. An oblique view was then obtained to line up the anterior and posterior components of the joint. The skin overlying the expected trajectory of the block needle was then anesthetized with 3 ml of 1% lidocaine. Next, depending on patient habitus a 22G or 25G 3 inch Quincke needle was then advanced using a coaxial technique to the inferior/posterior aspect of the joint, just under the posterior superior iliac spine. Upon contacting the joint capsule, negative aspiration was performed to confirm no intravenous access. Next, Depomedrol 40 mg was injected.    Procedure was completed, signed using same technique and medications discussed above.      The needle was withdrawn and Band-Aids were applied. The patient was transported to the

## 2025-07-03 ENCOUNTER — OFFICE VISIT (OUTPATIENT)
Age: 75
End: 2025-07-03
Payer: MEDICARE

## 2025-07-03 VITALS
HEART RATE: 69 BPM | HEIGHT: 64 IN | BODY MASS INDEX: 28 KG/M2 | DIASTOLIC BLOOD PRESSURE: 94 MMHG | SYSTOLIC BLOOD PRESSURE: 144 MMHG | WEIGHT: 164 LBS

## 2025-07-03 DIAGNOSIS — E78.2 MIXED HYPERLIPIDEMIA: ICD-10-CM

## 2025-07-03 DIAGNOSIS — R06.02 SHORTNESS OF BREATH: ICD-10-CM

## 2025-07-03 DIAGNOSIS — I34.0 NONRHEUMATIC MITRAL VALVE REGURGITATION: Primary | ICD-10-CM

## 2025-07-03 DIAGNOSIS — I10 PRIMARY HYPERTENSION: ICD-10-CM

## 2025-07-03 DIAGNOSIS — R94.31 EKG, ABNORMAL: ICD-10-CM

## 2025-07-03 PROCEDURE — G8419 CALC BMI OUT NRM PARAM NOF/U: HCPCS | Performed by: INTERNAL MEDICINE

## 2025-07-03 PROCEDURE — 1036F TOBACCO NON-USER: CPT | Performed by: INTERNAL MEDICINE

## 2025-07-03 PROCEDURE — 1123F ACP DISCUSS/DSCN MKR DOCD: CPT | Performed by: INTERNAL MEDICINE

## 2025-07-03 PROCEDURE — 1126F AMNT PAIN NOTED NONE PRSNT: CPT | Performed by: INTERNAL MEDICINE

## 2025-07-03 PROCEDURE — 99214 OFFICE O/P EST MOD 30 MIN: CPT | Performed by: INTERNAL MEDICINE

## 2025-07-03 PROCEDURE — 93000 ELECTROCARDIOGRAM COMPLETE: CPT | Performed by: INTERNAL MEDICINE

## 2025-07-03 PROCEDURE — 3077F SYST BP >= 140 MM HG: CPT | Performed by: INTERNAL MEDICINE

## 2025-07-03 PROCEDURE — 3080F DIAST BP >= 90 MM HG: CPT | Performed by: INTERNAL MEDICINE

## 2025-07-03 PROCEDURE — 1090F PRES/ABSN URINE INCON ASSESS: CPT | Performed by: INTERNAL MEDICINE

## 2025-07-03 PROCEDURE — G8428 CUR MEDS NOT DOCUMENT: HCPCS | Performed by: INTERNAL MEDICINE

## 2025-07-03 PROCEDURE — G8399 PT W/DXA RESULTS DOCUMENT: HCPCS | Performed by: INTERNAL MEDICINE

## 2025-07-03 PROCEDURE — 3017F COLORECTAL CA SCREEN DOC REV: CPT | Performed by: INTERNAL MEDICINE

## 2025-07-03 RX ORDER — LOSARTAN POTASSIUM 25 MG/1
25 TABLET ORAL DAILY
Qty: 90 TABLET | Refills: 1 | Status: SHIPPED | OUTPATIENT
Start: 2025-07-03

## 2025-07-03 RX ORDER — POTASSIUM CHLORIDE 1500 MG/1
20 TABLET, EXTENDED RELEASE ORAL DAILY
Qty: 30 TABLET | Refills: 5 | Status: SHIPPED | OUTPATIENT
Start: 2025-07-03

## 2025-07-03 NOTE — PROGRESS NOTES
Problem List    Diagnosis Date Noted    Injury of nail bed of toe 2022     Priority: Medium    Nonrheumatic mitral valve regurgitation 2022     Priority: Medium    Chronic fatigue 2022     Priority: Medium    Shortness of breath 2022     Priority: Medium    EKG, abnormal 2022     Priority: Medium    Candidiasis 2025    Greater trochanteric bursitis, left 2025    Abnormal antibody titer 2025    Chronic sinusitis 2024    Left flank pain 2024    Perennial allergic conjunctivitis of both eyes 2019    [place] cornea dystrophy 03/15/2019    Chronic follicular conjunctivitis 03/15/2019    Osteopenia of left hip 10/29/2018     DEXA Oct 18        Age-related cataract of both eyes 10/08/2018    Dry eyes 10/08/2018    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    Atrophic vaginitis 2016    Glaucoma suspect 2016    Mixed hyperlipidemia 2013    Hypertension       Past Surgical History:   Procedure Laterality Date    BREAST SURGERY  03    CYST REMOVAL  93    RLQ removed at the time of the hernia repair    HERNIA REPAIR  93    rt side    HYSTEROSCOPY, STERILIZE W IMPLANTS  99    KNEE ARTHROSCOPY  1986    rt knee    OTHER SURGICAL HISTORY  98,99    stromal pucture rt eye for [employer] Finger print dystrophy    TONSILLECTOMY       Family History   Problem Relation Age of Onset    Heart Attack Father         age 35     Cancer Mother         breast  later in life    High Cholesterol Sister     High Cholesterol Brother      Social History     Tobacco Use    Smoking status: Never    Smokeless tobacco: Never   Substance Use Topics    Alcohol use: Yes     Alcohol/week: 1.0 standard drink of alcohol     Types: 1 Shots of liquor per week     Comment: occ         ROS:    No obvious pertinent positives on review of systems except for what was outlined in the HPI today.      PHYSICAL EXAM:     BP (!) 144/94   Pulse 69   Ht

## 2025-07-09 ENCOUNTER — COMMUNITY OUTREACH (OUTPATIENT)
Dept: PRIMARY CARE CLINIC | Facility: CLINIC | Age: 75
End: 2025-07-09

## 2025-07-23 ENCOUNTER — TELEPHONE (OUTPATIENT)
Dept: PRIMARY CARE CLINIC | Facility: CLINIC | Age: 75
End: 2025-07-23

## 2025-07-23 DIAGNOSIS — T85.9XXA COMPLICATION OF BREAST IMPLANT: Primary | ICD-10-CM

## 2025-07-23 RX ORDER — CLOTRIMAZOLE AND BETAMETHASONE DIPROPIONATE 10; .64 MG/G; MG/G
CREAM TOPICAL
Qty: 45 G | Refills: 2 | Status: SHIPPED | OUTPATIENT
Start: 2025-07-23

## 2025-07-23 NOTE — TELEPHONE ENCOUNTER
Spoke with patient as she had to leave before I could see her for her office visit today.  States that she was coming to show me a red rash under her breasts.  Notes that recently she was outside working on the porch and remembers getting sweaty.  States that the rash was red, itchy and irritated.  She has tried some Hydrocortisone cream and it has improved some.  Also notes that she has noticed that her left breast implant is hard.  States that at times it causes pain.  Notes that her right implant feels normal.  Wonders about seeing a plastic surgeon to have this evaluated.  Will refer her to Dr. Elizabeth Blakemore.

## 2025-07-30 DIAGNOSIS — R94.31 EKG, ABNORMAL: ICD-10-CM

## 2025-07-30 DIAGNOSIS — R06.02 SHORTNESS OF BREATH: ICD-10-CM

## 2025-07-30 LAB
ANION GAP SERPL CALC-SCNC: 12 MMOL/L (ref 7–16)
BUN SERPL-MCNC: 11 MG/DL (ref 8–23)
CALCIUM SERPL-MCNC: 10.1 MG/DL (ref 8.8–10.2)
CHLORIDE SERPL-SCNC: 99 MMOL/L (ref 98–107)
CO2 SERPL-SCNC: 27 MMOL/L (ref 20–29)
CREAT SERPL-MCNC: 0.78 MG/DL (ref 0.6–1.1)
GLUCOSE SERPL-MCNC: 97 MG/DL (ref 70–99)
POTASSIUM SERPL-SCNC: 3.7 MMOL/L (ref 3.5–5.1)
SODIUM SERPL-SCNC: 138 MMOL/L (ref 136–145)

## 2025-08-04 ENCOUNTER — OFFICE VISIT (OUTPATIENT)
Age: 75
End: 2025-08-04
Payer: MEDICARE

## 2025-08-04 DIAGNOSIS — M25.552 BILATERAL HIP PAIN: Primary | ICD-10-CM

## 2025-08-04 DIAGNOSIS — M25.551 BILATERAL HIP PAIN: Primary | ICD-10-CM

## 2025-08-04 PROCEDURE — G8419 CALC BMI OUT NRM PARAM NOF/U: HCPCS | Performed by: ANESTHESIOLOGY

## 2025-08-04 PROCEDURE — 3017F COLORECTAL CA SCREEN DOC REV: CPT | Performed by: ANESTHESIOLOGY

## 2025-08-04 PROCEDURE — 1090F PRES/ABSN URINE INCON ASSESS: CPT | Performed by: ANESTHESIOLOGY

## 2025-08-04 PROCEDURE — G8428 CUR MEDS NOT DOCUMENT: HCPCS | Performed by: ANESTHESIOLOGY

## 2025-08-04 PROCEDURE — 99213 OFFICE O/P EST LOW 20 MIN: CPT | Performed by: ANESTHESIOLOGY

## 2025-08-04 PROCEDURE — 1036F TOBACCO NON-USER: CPT | Performed by: ANESTHESIOLOGY

## 2025-08-04 PROCEDURE — 1123F ACP DISCUSS/DSCN MKR DOCD: CPT | Performed by: ANESTHESIOLOGY

## 2025-08-04 PROCEDURE — G8399 PT W/DXA RESULTS DOCUMENT: HCPCS | Performed by: ANESTHESIOLOGY

## 2025-08-05 ENCOUNTER — TELEPHONE (OUTPATIENT)
Dept: PRIMARY CARE CLINIC | Facility: CLINIC | Age: 75
End: 2025-08-05

## 2025-08-05 DIAGNOSIS — M16.0 PRIMARY OSTEOARTHRITIS OF BOTH HIPS: Primary | ICD-10-CM

## 2025-08-07 ENCOUNTER — OFFICE VISIT (OUTPATIENT)
Age: 75
End: 2025-08-07
Payer: MEDICARE

## 2025-08-07 VITALS
HEIGHT: 64 IN | DIASTOLIC BLOOD PRESSURE: 78 MMHG | BODY MASS INDEX: 28.68 KG/M2 | SYSTOLIC BLOOD PRESSURE: 124 MMHG | WEIGHT: 168 LBS | HEART RATE: 68 BPM

## 2025-08-07 DIAGNOSIS — I34.0 NONRHEUMATIC MITRAL VALVE REGURGITATION: Primary | ICD-10-CM

## 2025-08-07 DIAGNOSIS — E78.2 MIXED HYPERLIPIDEMIA: ICD-10-CM

## 2025-08-07 DIAGNOSIS — R94.31 EKG, ABNORMAL: ICD-10-CM

## 2025-08-07 DIAGNOSIS — I10 PRIMARY HYPERTENSION: ICD-10-CM

## 2025-08-07 PROCEDURE — 3017F COLORECTAL CA SCREEN DOC REV: CPT | Performed by: INTERNAL MEDICINE

## 2025-08-07 PROCEDURE — 1090F PRES/ABSN URINE INCON ASSESS: CPT | Performed by: INTERNAL MEDICINE

## 2025-08-07 PROCEDURE — 99214 OFFICE O/P EST MOD 30 MIN: CPT | Performed by: INTERNAL MEDICINE

## 2025-08-07 PROCEDURE — 1036F TOBACCO NON-USER: CPT | Performed by: INTERNAL MEDICINE

## 2025-08-07 PROCEDURE — 3078F DIAST BP <80 MM HG: CPT | Performed by: INTERNAL MEDICINE

## 2025-08-07 PROCEDURE — G8419 CALC BMI OUT NRM PARAM NOF/U: HCPCS | Performed by: INTERNAL MEDICINE

## 2025-08-07 PROCEDURE — 1123F ACP DISCUSS/DSCN MKR DOCD: CPT | Performed by: INTERNAL MEDICINE

## 2025-08-07 PROCEDURE — G8428 CUR MEDS NOT DOCUMENT: HCPCS | Performed by: INTERNAL MEDICINE

## 2025-08-07 PROCEDURE — 3074F SYST BP LT 130 MM HG: CPT | Performed by: INTERNAL MEDICINE

## 2025-08-07 PROCEDURE — G8399 PT W/DXA RESULTS DOCUMENT: HCPCS | Performed by: INTERNAL MEDICINE

## 2025-08-07 RX ORDER — POTASSIUM CHLORIDE 750 MG/1
10 TABLET, EXTENDED RELEASE ORAL 2 TIMES DAILY
Qty: 180 TABLET | Refills: 3 | Status: SHIPPED | OUTPATIENT
Start: 2025-08-07

## 2025-08-19 ENCOUNTER — OFFICE VISIT (OUTPATIENT)
Dept: ORTHOPEDIC SURGERY | Age: 75
End: 2025-08-19
Payer: MEDICARE

## 2025-08-19 DIAGNOSIS — M16.11 PRIMARY OSTEOARTHRITIS OF RIGHT HIP: Primary | ICD-10-CM

## 2025-08-19 PROCEDURE — G8428 CUR MEDS NOT DOCUMENT: HCPCS | Performed by: ORTHOPAEDIC SURGERY

## 2025-08-19 PROCEDURE — G8399 PT W/DXA RESULTS DOCUMENT: HCPCS | Performed by: ORTHOPAEDIC SURGERY

## 2025-08-19 PROCEDURE — 99214 OFFICE O/P EST MOD 30 MIN: CPT | Performed by: ORTHOPAEDIC SURGERY

## 2025-08-19 PROCEDURE — 1090F PRES/ABSN URINE INCON ASSESS: CPT | Performed by: ORTHOPAEDIC SURGERY

## 2025-08-19 PROCEDURE — 3017F COLORECTAL CA SCREEN DOC REV: CPT | Performed by: ORTHOPAEDIC SURGERY

## 2025-08-19 PROCEDURE — 1036F TOBACCO NON-USER: CPT | Performed by: ORTHOPAEDIC SURGERY

## 2025-08-19 PROCEDURE — G8419 CALC BMI OUT NRM PARAM NOF/U: HCPCS | Performed by: ORTHOPAEDIC SURGERY

## 2025-08-19 PROCEDURE — 1123F ACP DISCUSS/DSCN MKR DOCD: CPT | Performed by: ORTHOPAEDIC SURGERY

## 2025-08-20 ENCOUNTER — TELEPHONE (OUTPATIENT)
Age: 75
End: 2025-08-20

## 2025-08-20 DIAGNOSIS — M16.11 PRIMARY OSTEOARTHRITIS OF RIGHT HIP: Primary | ICD-10-CM

## 2025-08-26 ENCOUNTER — PATIENT MESSAGE (OUTPATIENT)
Age: 75
End: 2025-08-26

## 2025-08-26 DIAGNOSIS — M25.552 BILATERAL HIP PAIN: Primary | ICD-10-CM

## 2025-08-26 DIAGNOSIS — M25.551 BILATERAL HIP PAIN: Primary | ICD-10-CM
